# Patient Record
Sex: FEMALE | Race: WHITE | NOT HISPANIC OR LATINO | URBAN - METROPOLITAN AREA
[De-identification: names, ages, dates, MRNs, and addresses within clinical notes are randomized per-mention and may not be internally consistent; named-entity substitution may affect disease eponyms.]

---

## 2023-05-17 ENCOUNTER — OFFICE VISIT (OUTPATIENT)
Dept: PRIMARY CARE | Facility: CLINIC | Age: 33
End: 2023-05-17
Payer: COMMERCIAL

## 2023-05-17 VITALS
SYSTOLIC BLOOD PRESSURE: 128 MMHG | BODY MASS INDEX: 38.95 KG/M2 | DIASTOLIC BLOOD PRESSURE: 76 MMHG | HEIGHT: 65 IN | WEIGHT: 233.8 LBS | HEART RATE: 103 BPM

## 2023-05-17 DIAGNOSIS — F90.0 ATTENTION DEFICIT HYPERACTIVITY DISORDER (ADHD), PREDOMINANTLY INATTENTIVE TYPE: ICD-10-CM

## 2023-05-17 DIAGNOSIS — R41.840 ATTENTION DISTURBANCE: ICD-10-CM

## 2023-05-17 DIAGNOSIS — G90.A POTS (POSTURAL ORTHOSTATIC TACHYCARDIA SYNDROME): Primary | ICD-10-CM

## 2023-05-17 DIAGNOSIS — Z00.00 HEALTHCARE MAINTENANCE: ICD-10-CM

## 2023-05-17 PROBLEM — G47.00 INSOMNIA: Status: ACTIVE | Noted: 2023-05-17

## 2023-05-17 PROCEDURE — 99204 OFFICE O/P NEW MOD 45 MIN: CPT | Performed by: FAMILY MEDICINE

## 2023-05-17 PROCEDURE — 1036F TOBACCO NON-USER: CPT | Performed by: FAMILY MEDICINE

## 2023-05-17 RX ORDER — ZOLPIDEM TARTRATE 10 MG/1
10 TABLET ORAL NIGHTLY
COMMUNITY
End: 2023-05-24 | Stop reason: SDUPTHER

## 2023-05-17 RX ORDER — METOPROLOL TARTRATE 25 MG/1
25 TABLET, FILM COATED ORAL 2 TIMES DAILY
Qty: 60 TABLET | Refills: 5 | Status: SHIPPED | OUTPATIENT
Start: 2023-05-17 | End: 2023-11-13

## 2023-05-17 ASSESSMENT — ENCOUNTER SYMPTOMS
PALPITATIONS: 0
FACIAL SWELLING: 0
ACTIVITY CHANGE: 0
COUGH: 0
CONSTIPATION: 0
CONFUSION: 0
COLOR CHANGE: 0
DIARRHEA: 0
WHEEZING: 0
EYE DISCHARGE: 0
CHILLS: 0
FEVER: 0
APPETITE CHANGE: 0
ARTHRALGIAS: 0

## 2023-05-17 NOTE — PROGRESS NOTES
"Subjective   Patient ID: Abelino Dejesus is a 33 y.o. male who presents for New patient to establish .    HPI   Patient presents to [facility].  Patient is transgender and when patient was 14 was given information to talk to someone about hormone replacement therapy did not actually go through with hormone replacement therapy until age 19 or 20.  This was in [place] and [place].    Fam Hx  Mom () living,   Dad () living,     Exercise walks  ETOH once a week  Caffeine denies  Tobacco denies  From [place] moved to [place] with \"sister\" in basement in May 2020    Colonoscopy @ 43    Past Med Hx  Insomnia  POTS    Past Surg Hx  Appendectomy  Sparks teeth    Patient explains he cannot sit or stand for long periods of time.     Review of Systems   Constitutional:  Negative for activity change, appetite change, chills and fever.   HENT:  Negative for congestion, ear pain and facial swelling.    Eyes:  Negative for discharge.   Respiratory:  Negative for cough and wheezing.    Cardiovascular:  Negative for chest pain, palpitations and leg swelling.   Gastrointestinal:  Negative for constipation and diarrhea.   Musculoskeletal:  Negative for arthralgias.   Skin:  Negative for color change and pallor.   Neurological:  Negative for syncope.   Psychiatric/Behavioral:  Negative for confusion.        Objective   /76 (BP Location: Left arm)   Pulse 103   Ht 1.657 m (5' 5.25\")   Wt 106 kg (233 lb 12.8 oz)   BMI 38.61 kg/m²   BSA Body surface area is 2.21 meters squared.      Physical Exam  Constitutional:       General: He is not in acute distress.     Appearance: Normal appearance. He is not toxic-appearing.   HENT:      Head: Normocephalic.      Right Ear: Tympanic membrane, ear canal and external ear normal.      Left Ear: Tympanic membrane, ear canal and external ear normal.      Nose: Nose normal.      Mouth/Throat:      Pharynx: Oropharynx is clear.   Eyes:      Conjunctiva/sclera: Conjunctivae normal.      Pupils: " Pupils are equal, round, and reactive to light.   Cardiovascular:      Rate and Rhythm: Normal rate and regular rhythm.      Pulses: Normal pulses.      Heart sounds: Normal heart sounds.   Pulmonary:      Effort: No respiratory distress.      Breath sounds: No wheezing, rhonchi or rales.   Abdominal:      General: Bowel sounds are normal. There is no distension.      Palpations: Abdomen is soft.      Tenderness: There is no abdominal tenderness.   Musculoskeletal:         General: No swelling or tenderness.      Cervical back: No tenderness.   Skin:     Findings: No lesion or rash.   Neurological:      General: No focal deficit present.      Mental Status: He is alert and oriented to person, place, and time. Mental status is at baseline.      Gait: Gait normal.   Psychiatric:         Mood and Affect: Mood normal.         Behavior: Behavior normal.         Thought Content: Thought content normal.         Judgment: Judgment normal.       Legacy Encounter on 12/12/2022   Component Date Value Ref Range Status    SARS-COV-2 RESULT 12/12/2022 NOT DETECTED  Not Detected Final    Comment: .  This test has received FDA Emergency Use Authorization (EUA) and has been   verified by [facility]. This test is only   authorized for the duration of time that circumstances exist to justify the   authorization of the emergency use of in vitro diagnostic tests for the   detection of SARS-CoV-2 virus and/or diagnosis of COVID-19 infection under   section 564(b)(1) of the Act, 21 U.S.C. 360bbb-3(b)(1), unless the   authorization is terminated or revoked sooner.     [facility] is certified under CLIA-88 as   qualified to perform high complexity testing. Testing is performed in the   [facility] laboratory located at [address] [place] [address].    SARS-CoV-2/Flu/RSV Multiplex Test:   Fact sheet for providers:  https://www.fda.gov/media/243182/download  Fact sheet for patients: https://www.fda.gov/media/893620/download     Legacy Encounter on 08/24/2022   Component Date Value Ref Range Status    SARS-COV-2 RESULT 08/24/2022 NOT DETECTED  Not Detected Final    Comment: .  This test has received FDA Emergency Use Authorization (EUA) and has been   verified by [facility]. This test is only   authorized for the duration of time that circumstances exist to justify the   authorization of the emergency use of in vitro diagnostic tests for the   detection of SARS-CoV-2 virus and/or diagnosis of COVID-19 infection under   section 564(b)(1) of the Act, 21 U.S.C. 360bbb-3(b)(1), unless the   authorization is terminated or revoked sooner.     [facility] is certified under CLIA-88 as   qualified to perform high complexity testing. Testing is performed in the   [facility] laboratory located at [address] [place] [address].    SARS-CoV-2/Flu/RSV Multiplex Test:   Fact sheet for providers: https://www.fda.gov/media/635414/download  Fact sheet for patients: https://www.fda.gov/media/014190/download       Current Outpatient Medications on File Prior to Visit   Medication Sig Dispense Refill    zolpidem (Ambien) 10 mg tablet Take 1 tablet (10 mg) by mouth once daily at bedtime.       No current facility-administered medications on file prior to visit.     No images are attached to the encounter.            Assessment/Plan   Diagnoses and all orders for this visit:  POTS (postural orthostatic tachycardia syndrome)  -     metoprolol tartrate (Lopressor) 25 mg tablet; Take 1 tablet (25 mg) by mouth 2 times a day.  Attention disturbance  -     Referral to Psychiatry; Future  Attention deficit hyperactivity disorder (ADHD), predominantly inattentive type  -     Drug Screen, Urine With Reflex to Confirmation    1.  Patient have additional blood work  performed  2.  Patient to schedule physical exam at his earliest convenience  3.  Patient to call for questions or concerns

## 2023-05-20 ENCOUNTER — LAB (OUTPATIENT)
Dept: LAB | Facility: LAB | Age: 33
End: 2023-05-20
Payer: COMMERCIAL

## 2023-05-20 DIAGNOSIS — R41.840 ATTENTION DISTURBANCE: ICD-10-CM

## 2023-05-20 DIAGNOSIS — G90.A POTS (POSTURAL ORTHOSTATIC TACHYCARDIA SYNDROME): ICD-10-CM

## 2023-05-20 DIAGNOSIS — Z00.00 HEALTHCARE MAINTENANCE: ICD-10-CM

## 2023-05-20 LAB — THYROTROPIN (MIU/L) IN SER/PLAS BY DETECTION LIMIT <= 0.05 MIU/L: 3.29 MIU/L (ref 0.44–3.98)

## 2023-05-20 PROCEDURE — 85025 COMPLETE CBC W/AUTO DIFF WBC: CPT

## 2023-05-20 PROCEDURE — 80061 LIPID PANEL: CPT

## 2023-05-20 PROCEDURE — 80053 COMPREHEN METABOLIC PANEL: CPT

## 2023-05-20 PROCEDURE — 84443 ASSAY THYROID STIM HORMONE: CPT

## 2023-05-20 PROCEDURE — 36415 COLL VENOUS BLD VENIPUNCTURE: CPT

## 2023-05-20 PROCEDURE — 80307 DRUG TEST PRSMV CHEM ANLYZR: CPT

## 2023-05-21 LAB
AMPHETAMINE (PRESENCE) IN URINE BY SCREEN METHOD: NORMAL
BARBITURATES PRESENCE IN URINE BY SCREEN METHOD: NORMAL
BENZODIAZEPINE (PRESENCE) IN URINE BY SCREEN METHOD: NORMAL
CANNABINOIDS IN URINE BY SCREEN METHOD: NORMAL
CHOLESTEROL (MG/DL) IN SER/PLAS: 128 MG/DL (ref 0–199)
CHOLESTEROL IN HDL (MG/DL) IN SER/PLAS: 47.8 MG/DL
CHOLESTEROL/HDL RATIO: 2.7
COCAINE (PRESENCE) IN URINE BY SCREEN METHOD: NORMAL
DRUG SCREEN COMMENT URINE: NORMAL
FENTANYL URINE: NORMAL
LDL: 70 MG/DL (ref 0–99)
METHADONE (PRESENCE) IN URINE BY SCREEN METHOD: NORMAL
OPIATES (PRESENCE) IN URINE BY SCREEN METHOD: NORMAL
OXYCODONE (PRESENCE) IN URINE BY SCREEN METHOD: NORMAL
PHENCYCLIDINE (PRESENCE) IN URINE BY SCREEN METHOD: NORMAL
TOBACCO SCREEN, URINE: NEGATIVE
TRIGLYCERIDE (MG/DL) IN SER/PLAS: 50 MG/DL (ref 0–149)
VLDL: 10 MG/DL (ref 0–40)

## 2023-05-24 ENCOUNTER — OFFICE VISIT (OUTPATIENT)
Dept: PRIMARY CARE | Facility: CLINIC | Age: 33
End: 2023-05-24
Payer: COMMERCIAL

## 2023-05-24 VITALS
SYSTOLIC BLOOD PRESSURE: 122 MMHG | BODY MASS INDEX: 38.51 KG/M2 | DIASTOLIC BLOOD PRESSURE: 78 MMHG | HEART RATE: 86 BPM | WEIGHT: 233.2 LBS

## 2023-05-24 DIAGNOSIS — G47.00 INSOMNIA, UNSPECIFIED TYPE: Primary | ICD-10-CM

## 2023-05-24 PROBLEM — E66.9 OBESITY, CLASS II, BMI 35-39.9: Status: ACTIVE | Noted: 2021-04-05

## 2023-05-24 PROBLEM — E66.812 OBESITY, CLASS II, BMI 35-39.9: Status: ACTIVE | Noted: 2021-04-05

## 2023-05-24 PROCEDURE — 99214 OFFICE O/P EST MOD 30 MIN: CPT | Performed by: FAMILY MEDICINE

## 2023-05-24 PROCEDURE — 1036F TOBACCO NON-USER: CPT | Performed by: FAMILY MEDICINE

## 2023-05-24 RX ORDER — ZOLPIDEM TARTRATE 10 MG/1
10 TABLET ORAL NIGHTLY
Qty: 30 TABLET | Refills: 0 | Status: SHIPPED | OUTPATIENT
Start: 2023-05-24 | End: 2024-02-16 | Stop reason: SDUPTHER

## 2023-05-24 RX ORDER — ZOLPIDEM TARTRATE 10 MG/1
10 TABLET ORAL NIGHTLY
Qty: 30 TABLET | Refills: 0 | Status: SHIPPED | OUTPATIENT
Start: 2023-06-23 | End: 2024-02-16 | Stop reason: SDUPTHER

## 2023-05-24 RX ORDER — ZOLPIDEM TARTRATE 10 MG/1
10 TABLET ORAL NIGHTLY
Qty: 30 TABLET | Refills: 0 | Status: SHIPPED | OUTPATIENT
Start: 2023-07-23 | End: 2023-08-16 | Stop reason: SDUPTHER

## 2023-05-24 ASSESSMENT — ENCOUNTER SYMPTOMS
APPETITE CHANGE: 0
ACTIVITY CHANGE: 0
CONFUSION: 0
WHEEZING: 0
COUGH: 0
PALPITATIONS: 0
FEVER: 0
CONSTIPATION: 0
SLEEP DISTURBANCE: 1
CHILLS: 0
ARTHRALGIAS: 0
DIARRHEA: 0
EYE DISCHARGE: 0
FACIAL SWELLING: 0
COLOR CHANGE: 0

## 2023-05-24 NOTE — PROGRESS NOTES
Subjective   Patient ID: Abelino Dejesus is a 33 y.o. male who presents for Med Refill.    HPI   Patient reports he has had insomnia for quite some time.  Reports that he has tried several medications in the past without success this medication was started with his primary care doctor in [place] or [place]. Dr. Downey had started this in [place]. Tyelnol PM, Lunesta, sonata, melatonin, Zquil without relief. He was concerned that all of these were giving him restless leg syndrome.     Review of Systems   Constitutional:  Negative for activity change, appetite change, chills and fever.   HENT:  Negative for congestion, ear pain and facial swelling.    Eyes:  Negative for discharge.   Respiratory:  Negative for cough and wheezing.    Cardiovascular:  Negative for chest pain, palpitations and leg swelling.   Gastrointestinal:  Negative for constipation and diarrhea.   Musculoskeletal:  Negative for arthralgias.   Skin:  Negative for color change and pallor.   Neurological:  Negative for syncope.   Psychiatric/Behavioral:  Positive for sleep disturbance. Negative for confusion.        Objective   /78 (BP Location: Right arm)   Pulse 86   Wt 106 kg (233 lb 3.2 oz)   BMI 38.51 kg/m²   BSA Body surface area is 2.21 meters squared.      Physical Exam  Constitutional:       General: He is not in acute distress.     Appearance: Normal appearance. He is not toxic-appearing.   HENT:      Head: Normocephalic.      Right Ear: Tympanic membrane, ear canal and external ear normal.      Left Ear: Tympanic membrane, ear canal and external ear normal.   Eyes:      Conjunctiva/sclera: Conjunctivae normal.      Pupils: Pupils are equal, round, and reactive to light.   Cardiovascular:      Rate and Rhythm: Normal rate and regular rhythm.      Pulses: Normal pulses.      Heart sounds: Normal heart sounds.   Pulmonary:      Effort: No respiratory distress.      Breath sounds: No wheezing, rhonchi or rales.   Musculoskeletal:          General: No swelling or tenderness.      Cervical back: No tenderness.   Skin:     Findings: No lesion or rash.   Neurological:      General: No focal deficit present.      Mental Status: He is alert and oriented to person, place, and time. Mental status is at baseline.      Gait: Gait normal.   Psychiatric:         Mood and Affect: Mood normal.         Behavior: Behavior normal.         Thought Content: Thought content normal.         Judgment: Judgment normal.       Lab on 05/20/2023   Component Date Value Ref Range Status    Tobacco Screen, Urine 05/20/2023 NEGATIVE   Final    Cotinine, a metabolite of nicotine, is measured to screen   for nicotine exposure. The cut-off is set at 300ng/mL to   detect active exposure (smoking).    This test was developed and its performance characteristics   were determined by the [facility].    TSH 05/20/2023 3.29  0.44 - 3.98 mIU/L Final     TSH testing is performed using different testing    methodology at [facility] than at other    [facility]. Direct result comparisons should    only be made within the same method.    Cholesterol 05/20/2023 128  0 - 199 mg/dL Final    .      AGE      DESIRABLE   BORDERLINE HIGH   HIGH     0-19 Y     0 - 169       170 - 199     >/= 200    20-24 Y     0 - 189       190 - 224     >/= 225         >24 Y     0 - 199       200 - 239     >/= 240   **All ranges are based on fasting samples. Specific   therapeutic targets will vary based on patient-specific   cardiac risk.  .   Pediatric guidelines reference:Pediatrics 2011, 128(S5).   Adult guidelines reference: NCEP ATPIII Guidelines,     YASMIN 2001, 258:2486-97  .   Venipuncture immediately after or during the    administration of Metamizole may lead to falsely   low results. Testing should be performed immediately   prior to Metamizole dosing.    HDL 05/20/2023 47.8  mg/dL Final    .      AGE      VERY LOW   LOW     NORMAL    HIGH        0-19 Y       < 35   < 40     40-45     ----    20-24 Y       ----   < 40       >45     ----      >24 Y       ----   < 40     40-60      >60  .    Cholesterol/HDL Ratio 05/20/2023 2.7   Final    REF VALUES  DESIRABLE  < 3.4  HIGH RISK  > 5.0    LDL 05/20/2023 70  0 - 99 mg/dL Final    .                           NEAR      BORD      AGE      DESIRABLE  OPTIMAL    HIGH     HIGH     VERY HIGH     0-19 Y     0 - 109     ---    110-129   >/= 130     ----    20-24 Y     0 - 119     ---    120-159   >/= 160     ----      >24 Y     0 -  99   100-129  130-159   160-189     >/=190  .    VLDL 05/20/2023 10  0 - 40 mg/dL Final    Triglycerides 05/20/2023 50  0 - 149 mg/dL Final    .      AGE      DESIRABLE   BORDERLINE HIGH   HIGH     VERY HIGH   0 D-90 D    19 - 174         ----         ----        ----  91 D- 9 Y     0 -  74        75 -  99     >/= 100      ----    10-19 Y     0 -  89        90 - 129     >/= 130      ----    20-24 Y     0 - 114       115 - 149     >/= 150      ----         >24 Y     0 - 149       150 - 199    200- 499    >/= 500  .   Venipuncture immediately after or during the    administration of Metamizole may lead to falsely   low results. Testing should be performed immediately   prior to Metamizole dosing.    Glucose 05/20/2023 96  74 - 99 mg/dL Final    Sodium 05/20/2023 138  136 - 145 mmol/L Final    Potassium 05/20/2023 4.3  3.5 - 5.3 mmol/L Final    Chloride 05/20/2023 108 (H)  98 - 107 mmol/L Final    Bicarbonate 05/20/2023 24  21 - 32 mmol/L Final    Anion Gap 05/20/2023 10  10 - 20 mmol/L Final    Urea Nitrogen 05/20/2023 13  6 - 23 mg/dL Final    Creatinine 05/20/2023 0.88  0.50 - 1.30 mg/dL Final    GFR MALE 05/20/2023 >90  >90 mL/min/1.73m2 Final     CALCULATIONS OF ESTIMATED GFR ARE PERFORMED   USING THE 2021 CKD-EPI STUDY REFIT EQUATION   WITHOUT THE RACE VARIABLE FOR THE IDMS-TRACEABLE   CREATININE METHODS.    https://jasn.asnjournals.org/content/early/2021/09/22/ASN.6325128894     Calcium 05/20/2023 9.0  8.6 - 10.6 mg/dL Final    Albumin 05/20/2023 4.3  3.4 - 5.0 g/dL Final    Alkaline Phosphatase 05/20/2023 67  33 - 120 U/L Final    Total Protein 05/20/2023 7.1  6.4 - 8.2 g/dL Final    AST 05/20/2023 17  9 - 39 U/L Final    Total Bilirubin 05/20/2023 0.3  0.0 - 1.2 mg/dL Final    ALT (SGPT) 05/20/2023 21  10 - 52 U/L Final     Patients treated with Sulfasalazine may generate    falsely decreased results for ALT.    WBC 05/20/2023 7.0  4.4 - 11.3 x10E9/L Final    nRBC 05/20/2023 0.0  0.0 - 0.0 /100 WBC Final    RBC 05/20/2023 4.52  4.50 - 5.90 x10E12/L Final    Hemoglobin 05/20/2023 14.3  13.5 - 17.5 g/dL Final    Hematocrit 05/20/2023 42.9  41.0 - 52.0 % Final    MCV 05/20/2023 95  80 - 100 fL Final    MCHC 05/20/2023 33.3  32.0 - 36.0 g/dL Final    Platelets 05/20/2023 266  150 - 450 x10E9/L Final    RDW 05/20/2023 12.2  11.5 - 14.5 % Final    Neutrophils % 05/20/2023 51.5  40.0 - 80.0 % Final    Immature Granulocytes %, Automated 05/20/2023 0.1  0.0 - 0.9 % Final     Immature Granulocyte Count (IG) includes promyelocytes,    myelocytes and metamyelocytes but does not include bands.   Percent differential counts (%) should be interpreted in the   context of the absolute cell counts (cells/L).    Lymphocytes % 05/20/2023 37.7  13.0 - 44.0 % Final    Monocytes % 05/20/2023 8.1  2.0 - 10.0 % Final    Eosinophils % 05/20/2023 1.7  0.0 - 6.0 % Final    Basophils % 05/20/2023 0.9  0.0 - 2.0 % Final    Neutrophils Absolute 05/20/2023 3.62  1.20 - 7.70 x10E9/L Final    Lymphocytes Absolute 05/20/2023 2.65  1.20 - 4.80 x10E9/L Final    Monocytes Absolute 05/20/2023 0.57  0.10 - 1.00 x10E9/L Final    Eosinophils Absolute 05/20/2023 0.12  0.00 - 0.70 x10E9/L Final    Basophils Absolute 05/20/2023 0.06  0.00 - 0.10 x10E9/L Final   Office Visit on 05/17/2023   Component Date Value Ref Range Status    DRUG SCREEN COMMENT URINE 05/20/2023 SEE BELOW   Final    Drug screen results are presumptive and should  not be used to assess   compliance with prescribed medication. Definitive confirmatory drug testing   has been added to this sample for any positive screen result and will be   reported separately.   .  Toxicology screening results are reported qualitatively. The concentration   must be greater than or equal to the cutoff to be reported as positive. The   concentration at which the screening test can detect an individual drug or   metabolite varies. The absence of expected drug(s) and/or drug metabolite(s)   may indicate non-compliance, inappropriate timing of specimen collection   relative to drug administration, poor drug absorption, diluted/adulterated   urine, or limitations of testing. For medical purposes only; not valid for   forensic use.   .  Interpretive questions should be directed to the laboratory medical   directors.      Amphetamine Screen, Urine 05/20/2023 PRESUMPTIVE NEGATIVE  NEGATIVE Final     CUTOFF LEVEL:  500 NG/ML   Cross-reactivity has been reported with high concentrations   of the following drugs: buproprion, chloroquine, chlorpromazine,   ephedrine, mephentermine, fenfluramine, phentermine,   phenylpropanolamine, pseudoephedrine, and propranolol.    Barbiturate Screen, Urine 05/20/2023 PRESUMPTIVE NEGATIVE  NEGATIVE Final     CUTOFF LEVEL: 200 NG/ML    BENZODIAZEPINE (PRESENCE) IN URINE* 05/20/2023 PRESUMPTIVE NEGATIVE  NEGATIVE Final     CUTOFF LEVEL: 200 NG/ML    Cannabinoid Screen, Urine 05/20/2023 PRESUMPTIVE NEGATIVE  NEGATIVE Final     CUTOFF LEVEL: 50 NG/ML    Cocaine Screen, Urine 05/20/2023 PRESUMPTIVE NEGATIVE  NEGATIVE Final     CUTOFF LEVEL: 150 NG/ML    Fentanyl, Ur 05/20/2023 PRESUMPTIVE NEGATIVE  NEGATIVE Final     CUTOFF LEVEL:  5 NG/ML    Methadone Screen, Urine 05/20/2023 PRESUMPTIVE NEGATIVE  NEGATIVE Final     CUTOFF LEVEL: 150 NG/ML   The metabolite L-alpha-acetylmethadol (LAAM) is not   detected by this method in concentrations that would   be found in the urine of  patients on LAAM therapy.    Opiate Screen, Urine 05/20/2023 PRESUMPTIVE NEGATIVE  NEGATIVE Final     CUTOFF LEVEL: 300 NG/ML   The opiate screen does not detect fentanyl, meperidine, or    tramadol. Oxycodone is not consistently detected (refer to   Oxycodone Screen, Urine result).    Oxycodone Screen, Ur 05/20/2023 PRESUMPTIVE NEGATIVE  NEGATIVE Final     CUTOFF LEVEL: 100 NG/ML    This test will accurately detect both oxycodone and oxymorphone.         PCP Screen, Urine 05/20/2023 PRESUMPTIVE NEGATIVE  NEGATIVE Final     CUTOFF LEVEL:  25 NG/ML   Cross-reactivity has been reported with dextromethorphan.   Legacy Encounter on 12/12/2022   Component Date Value Ref Range Status    SARS-COV-2 RESULT 12/12/2022 NOT DETECTED  Not Detected Final    Comment: .  This test has received FDA Emergency Use Authorization (EUA) and has been   verified by [facility]. This test is only   authorized for the duration of time that circumstances exist to justify the   authorization of the emergency use of in vitro diagnostic tests for the   detection of SARS-CoV-2 virus and/or diagnosis of COVID-19 infection under   section 564(b)(1) of the Act, 21 U.S.C. 360bbb-3(b)(1), unless the   authorization is terminated or revoked sooner.     [facility] is certified under CLIA-88 as   qualified to perform high complexity testing. Testing is performed in the   [facility] laboratory located at [address] [place] [address].    SARS-CoV-2/Flu/RSV Multiplex Test:   Fact sheet for providers: https://www.fda.gov/media/232462/download  Fact sheet for patients: https://www.fda.gov/media/807018/download     Legacy Encounter on 08/24/2022   Component Date Value Ref Range Status    SARS-COV-2 RESULT 08/24/2022 NOT DETECTED  Not Detected Final    Comment: .  This test has received FDA Emergency Use Authorization (EUA) and has been   verified by [facility]  [facility]. This test is only   authorized for the duration of time that circumstances exist to justify the   authorization of the emergency use of in vitro diagnostic tests for the   detection of SARS-CoV-2 virus and/or diagnosis of COVID-19 infection under   section 564(b)(1) of the Act, 21 U.S.C. 360bbb-3(b)(1), unless the   authorization is terminated or revoked sooner.     [facility] is certified under CLIA-88 as   qualified to perform high complexity testing. Testing is performed in the   [facility] laboratory located at [address] [place] [address].    SARS-CoV-2/Flu/RSV Multiplex Test:   Fact sheet for providers: https://www.fda.gov/media/584244/download  Fact sheet for patients: https://www.fda.gov/media/345553/download       Current Outpatient Medications on File Prior to Visit   Medication Sig Dispense Refill    metoprolol tartrate (Lopressor) 25 mg tablet Take 1 tablet (25 mg) by mouth 2 times a day. 60 tablet 5    zolpidem (Ambien) 10 mg tablet Take 1 tablet (10 mg) by mouth once daily at bedtime.       No current facility-administered medications on file prior to visit.     No images are attached to the encounter.      OARRS:  Lilly Grimaldo DO on 5/24/2023  7:47 AM  I have personally reviewed the OARRS report for Álvaro Dejesus. I have considered the risks of abuse, dependence, addiction and diversion    Is the patient prescribed a combination of a benzodiazepine and opioid?  No    Last Urine Drug Screen / ordered today: No  Recent Results (from the past 07151 hour(s))   Drug Screen, Urine With Reflex to Confirmation    Collection Time: 05/20/23  9:28 AM   Result Value Ref Range    DRUG SCREEN COMMENT URINE SEE BELOW     Amphetamine Screen, Urine PRESUMPTIVE NEGATIVE NEGATIVE    Barbiturate Screen, Urine PRESUMPTIVE NEGATIVE NEGATIVE    BENZODIAZEPINE (PRESENCE) IN URINE BY SCREEN METHOD PRESUMPTIVE NEGATIVE NEGATIVE    Cannabinoid  Screen, Urine PRESUMPTIVE NEGATIVE NEGATIVE    Cocaine Screen, Urine PRESUMPTIVE NEGATIVE NEGATIVE    Fentanyl, Ur PRESUMPTIVE NEGATIVE NEGATIVE    Methadone Screen, Urine PRESUMPTIVE NEGATIVE NEGATIVE    Opiate Screen, Urine PRESUMPTIVE NEGATIVE NEGATIVE    Oxycodone Screen, Ur PRESUMPTIVE NEGATIVE NEGATIVE    PCP Screen, Urine PRESUMPTIVE NEGATIVE NEGATIVE     Results are as expected.     Controlled Substance Agreement:  Date of the Last Agreement: 5/24/2023  Reviewed Controlled Substance Agreement including but not limited to the benefits, risks, and alternatives to treatment with a Controlled Substance medication(s).    Sleep Aids:   What is the patient's goal of therapy? Better sleep  Is this being achieved with current treatment? yes    Activities of Daily Living:   Is your overall impression that this patient is benefiting (symptom reduction outweighs side effects) from sleep aid therapy? Yes     1. Physical Functioning: Same  2. Family Relationship: Same  3. Social Relationship: Same  4. Mood: Same  5. Sleep Patterns: Same  6. Overall Function: Same        Assessment/Plan   Diagnoses and all orders for this visit:  Insomnia, unspecified type  1.  Patient realizes he has to come in every 90 days to have this medication renewed  2.  Patient did sign Ambien contract 5/24/2023  3.  Patient's urine drug screen performed 5/20/2023  4.  Patient to call for questions or concerns

## 2023-05-24 NOTE — PATIENT INSTRUCTIONS
1.  Patient realizes he has to come in every 90 days to have this medication renewed  2.  Patient did sign Ambien contract 5/24/2023  3.  Patient's urine drug screen performed 5/20/2023  4.  Patient to call for questions or concerns

## 2023-06-14 LAB
ALANINE AMINOTRANSFERASE (SGPT) (U/L) IN SER/PLAS: 21 U/L (ref 7–45)
ALBUMIN (G/DL) IN SER/PLAS: 4.3 G/DL (ref 3.4–5)
ALKALINE PHOSPHATASE (U/L) IN SER/PLAS: 67 U/L (ref 33–110)
ANION GAP IN SER/PLAS: 10 MMOL/L (ref 10–20)
ASPARTATE AMINOTRANSFERASE (SGOT) (U/L) IN SER/PLAS: 17 U/L (ref 9–39)
BASOPHILS (10*3/UL) IN BLOOD BY AUTOMATED COUNT: 0.06 X10E9/L (ref 0–0.1)
BASOPHILS/100 LEUKOCYTES IN BLOOD BY AUTOMATED COUNT: 0.9 % (ref 0–2)
BILIRUBIN TOTAL (MG/DL) IN SER/PLAS: 0.3 MG/DL (ref 0–1.2)
CALCIUM (MG/DL) IN SER/PLAS: 9 MG/DL (ref 8.6–10.6)
CARBON DIOXIDE, TOTAL (MMOL/L) IN SER/PLAS: 24 MMOL/L (ref 21–32)
CHLORIDE (MMOL/L) IN SER/PLAS: 108 MMOL/L (ref 98–107)
CREATININE (MG/DL) IN SER/PLAS: 0.88 MG/DL (ref 0.5–1.05)
EOSINOPHILS (10*3/UL) IN BLOOD BY AUTOMATED COUNT: 0.12 X10E9/L (ref 0–0.7)
EOSINOPHILS/100 LEUKOCYTES IN BLOOD BY AUTOMATED COUNT: 1.7 % (ref 0–6)
ERYTHROCYTE DISTRIBUTION WIDTH (RATIO) BY AUTOMATED COUNT: 12.2 % (ref 11.5–14.5)
ERYTHROCYTE MEAN CORPUSCULAR HEMOGLOBIN CONCENTRATION (G/DL) BY AUTOMATED: 33.3 G/DL (ref 32–36)
ERYTHROCYTE MEAN CORPUSCULAR VOLUME (FL) BY AUTOMATED COUNT: 95 FL (ref 80–100)
ERYTHROCYTES (10*6/UL) IN BLOOD BY AUTOMATED COUNT: 4.52 X10E12/L (ref 4–5.2)
GFR FEMALE: 88 ML/MIN/1.73M2
GLUCOSE (MG/DL) IN SER/PLAS: 96 MG/DL (ref 74–99)
HEMATOCRIT (%) IN BLOOD BY AUTOMATED COUNT: 42.9 % (ref 36–46)
HEMOGLOBIN (G/DL) IN BLOOD: 14.3 G/DL (ref 12–16)
IMMATURE GRANULOCYTES/100 LEUKOCYTES IN BLOOD BY AUTOMATED COUNT: 0.1 % (ref 0–0.9)
LEUKOCYTES (10*3/UL) IN BLOOD BY AUTOMATED COUNT: 7 X10E9/L (ref 4.4–11.3)
LYMPHOCYTES (10*3/UL) IN BLOOD BY AUTOMATED COUNT: 2.65 X10E9/L (ref 1.2–4.8)
LYMPHOCYTES/100 LEUKOCYTES IN BLOOD BY AUTOMATED COUNT: 37.7 % (ref 13–44)
MONOCYTES (10*3/UL) IN BLOOD BY AUTOMATED COUNT: 0.57 X10E9/L (ref 0.1–1)
MONOCYTES/100 LEUKOCYTES IN BLOOD BY AUTOMATED COUNT: 8.1 % (ref 2–10)
NEUTROPHILS (10*3/UL) IN BLOOD BY AUTOMATED COUNT: 3.62 X10E9/L (ref 1.2–7.7)
NEUTROPHILS/100 LEUKOCYTES IN BLOOD BY AUTOMATED COUNT: 51.5 % (ref 40–80)
NRBC (PER 100 WBCS) BY AUTOMATED COUNT: 0 /100 WBC (ref 0–0)
PLATELETS (10*3/UL) IN BLOOD AUTOMATED COUNT: 266 X10E9/L (ref 150–450)
POTASSIUM (MMOL/L) IN SER/PLAS: 4.3 MMOL/L (ref 3.5–5.3)
PROTEIN TOTAL: 7.1 G/DL (ref 6.4–8.2)
SODIUM (MMOL/L) IN SER/PLAS: 138 MMOL/L (ref 136–145)
UREA NITROGEN (MG/DL) IN SER/PLAS: 13 MG/DL (ref 6–23)

## 2023-08-16 ENCOUNTER — OFFICE VISIT (OUTPATIENT)
Dept: PRIMARY CARE | Facility: CLINIC | Age: 33
End: 2023-08-16
Payer: COMMERCIAL

## 2023-08-16 VITALS
HEART RATE: 93 BPM | BODY MASS INDEX: 38.51 KG/M2 | WEIGHT: 233.2 LBS | SYSTOLIC BLOOD PRESSURE: 118 MMHG | DIASTOLIC BLOOD PRESSURE: 78 MMHG

## 2023-08-16 DIAGNOSIS — G47.00 INSOMNIA, UNSPECIFIED TYPE: Primary | ICD-10-CM

## 2023-08-16 PROCEDURE — 1036F TOBACCO NON-USER: CPT | Performed by: FAMILY MEDICINE

## 2023-08-16 PROCEDURE — 99214 OFFICE O/P EST MOD 30 MIN: CPT | Performed by: FAMILY MEDICINE

## 2023-08-16 RX ORDER — ZOLPIDEM TARTRATE 10 MG/1
10 TABLET ORAL NIGHTLY
Qty: 30 TABLET | Refills: 0 | Status: SHIPPED | OUTPATIENT
Start: 2023-09-15 | End: 2024-02-16 | Stop reason: SDUPTHER

## 2023-08-16 RX ORDER — ZOLPIDEM TARTRATE 10 MG/1
10 TABLET ORAL NIGHTLY
Qty: 30 TABLET | Refills: 0 | Status: SHIPPED | OUTPATIENT
Start: 2023-10-15 | End: 2023-11-17 | Stop reason: SDUPTHER

## 2023-08-16 RX ORDER — ZOLPIDEM TARTRATE 10 MG/1
10 TABLET ORAL NIGHTLY
Qty: 30 TABLET | Refills: 0 | Status: SHIPPED | OUTPATIENT
Start: 2023-08-16 | End: 2024-02-16 | Stop reason: SDUPTHER

## 2023-08-16 ASSESSMENT — ENCOUNTER SYMPTOMS
CONSTIPATION: 0
CHILLS: 0
FEVER: 0
ACTIVITY CHANGE: 0
CONFUSION: 0
ARTHRALGIAS: 0
WHEEZING: 0
DIARRHEA: 0
COLOR CHANGE: 0
APPETITE CHANGE: 0
EYE DISCHARGE: 0
PALPITATIONS: 0
FACIAL SWELLING: 0
COUGH: 0
SLEEP DISTURBANCE: 1

## 2023-08-16 NOTE — PROGRESS NOTES
Subjective   Patient ID: Abelino Dejesus is a 33 y.o. adult who presents for No chief complaint on file..    HPI   Patient reports he has had insomnia for quite some time.  Reports that he has tried several medications in the past without success this medication was started with his primary care doctor in [place] or [place]. Dr. Downey had started this in [place]. Tyelnol PM, Lunesta, sonata, melatonin, Zquil without relief. He was concerned that all of these were giving him restless leg syndrome.     Review of Systems   Constitutional:  Negative for activity change, appetite change, chills and fever.   HENT:  Negative for congestion, ear pain and facial swelling.    Eyes:  Negative for discharge.   Respiratory:  Negative for cough and wheezing.    Cardiovascular:  Negative for chest pain, palpitations and leg swelling.   Gastrointestinal:  Negative for constipation and diarrhea.   Musculoskeletal:  Negative for arthralgias.   Skin:  Negative for color change and pallor.   Neurological:  Negative for syncope.   Psychiatric/Behavioral:  Positive for sleep disturbance. Negative for confusion.        Objective   There were no vitals taken for this visit.  BSA There is no height or weight on file to calculate BSA.      Physical Exam  Constitutional:       General: He is not in acute distress.     Appearance: Normal appearance. He is not toxic-appearing.   HENT:      Head: Normocephalic.      Right Ear: Tympanic membrane, ear canal and external ear normal.      Left Ear: Tympanic membrane, ear canal and external ear normal.   Eyes:      Conjunctiva/sclera: Conjunctivae normal.      Pupils: Pupils are equal, round, and reactive to light.   Cardiovascular:      Rate and Rhythm: Normal rate and regular rhythm.      Pulses: Normal pulses.      Heart sounds: Normal heart sounds.   Pulmonary:      Effort: No respiratory distress.      Breath sounds: No wheezing, rhonchi or rales.   Musculoskeletal:         General: No swelling or  tenderness.      Cervical back: No tenderness.   Skin:     Findings: No lesion or rash.   Neurological:      General: No focal deficit present.      Mental Status: He is alert and oriented to person, place, and time. Mental status is at baseline.      Gait: Gait normal.   Psychiatric:         Mood and Affect: Mood normal.         Behavior: Behavior normal.         Thought Content: Thought content normal.         Judgment: Judgment normal.       Lab on 05/20/2023   Component Date Value Ref Range Status    Tobacco Screen, Urine 05/20/2023 NEGATIVE   Final    Cotinine, a metabolite of nicotine, is measured to screen   for nicotine exposure. The cut-off is set at 300ng/mL to   detect active exposure (smoking).    This test was developed and its performance characteristics   were determined by the [facility].    TSH 05/20/2023 3.29  0.44 - 3.98 mIU/L Final     TSH testing is performed using different testing    methodology at [facility] than at other    [facility]. Direct result comparisons should    only be made within the same method.    Cholesterol 05/20/2023 128  0 - 199 mg/dL Final    .      AGE      DESIRABLE   BORDERLINE HIGH   HIGH     0-19 Y     0 - 169       170 - 199     >/= 200    20-24 Y     0 - 189       190 - 224     >/= 225         >24 Y     0 - 199       200 - 239     >/= 240   **All ranges are based on fasting samples. Specific   therapeutic targets will vary based on patient-specific   cardiac risk.  .   Pediatric guidelines reference:Pediatrics 2011, 128(S5).   Adult guidelines reference: NCEP ATPIII Guidelines,     YASMIN 2001, 258:2486-97  .   Venipuncture immediately after or during the    administration of Metamizole may lead to falsely   low results. Testing should be performed immediately   prior to Metamizole dosing.    HDL 05/20/2023 47.8  mg/dL Final    .      AGE      VERY LOW   LOW     NORMAL    HIGH       0-19 Y       < 35    < 40     40-45     ----    20-24 Y       ----   < 40       >45     ----      >24 Y       ----   < 40     40-60      >60  .    Cholesterol/HDL Ratio 05/20/2023 2.7   Final    REF VALUES  DESIRABLE  < 3.4  HIGH RISK  > 5.0    LDL 05/20/2023 70  0 - 99 mg/dL Final    .                           NEAR      BORD      AGE      DESIRABLE  OPTIMAL    HIGH     HIGH     VERY HIGH     0-19 Y     0 - 109     ---    110-129   >/= 130     ----    20-24 Y     0 - 119     ---    120-159   >/= 160     ----      >24 Y     0 -  99   100-129  130-159   160-189     >/=190  .    VLDL 05/20/2023 10  0 - 40 mg/dL Final    Triglycerides 05/20/2023 50  0 - 149 mg/dL Final    .      AGE      DESIRABLE   BORDERLINE HIGH   HIGH     VERY HIGH   0 D-90 D    19 - 174         ----         ----        ----  91 D- 9 Y     0 -  74        75 -  99     >/= 100      ----    10-19 Y     0 -  89        90 - 129     >/= 130      ----    20-24 Y     0 - 114       115 - 149     >/= 150      ----         >24 Y     0 - 149       150 - 199    200- 499    >/= 500  .   Venipuncture immediately after or during the    administration of Metamizole may lead to falsely   low results. Testing should be performed immediately   prior to Metamizole dosing.    Glucose 05/20/2023 96  74 - 99 mg/dL Final    Sodium 05/20/2023 138  136 - 145 mmol/L Final    Potassium 05/20/2023 4.3  3.5 - 5.3 mmol/L Final    Chloride 05/20/2023 108 (H)  98 - 107 mmol/L Final    Bicarbonate 05/20/2023 24  21 - 32 mmol/L Final    Anion Gap 05/20/2023 10  10 - 20 mmol/L Final    Urea Nitrogen 05/20/2023 13  6 - 23 mg/dL Final    Creatinine 05/20/2023 0.88  0.50 - 1.05 mg/dL Final    GFR Female 05/20/2023 88  >90 mL/min/1.73m2 Corrected     CALCULATIONS OF ESTIMATED GFR ARE PERFORMED   USING THE 2021 CKD-EPI STUDY REFIT EQUATION   WITHOUT THE RACE VARIABLE FOR THE IDMS-TRACEABLE   CREATININE METHODS.    https://jasn.asnjournals.org/content/early/2021/09/22/ASN.[email or web address]  This is a corrected  result. Previous value was DNR, verified at 06/14/2023   10:00    Calcium 05/20/2023 9.0  8.6 - 10.6 mg/dL Final    Albumin 05/20/2023 4.3  3.4 - 5.0 g/dL Final    Alkaline Phosphatase 05/20/2023 67  33 - 110 U/L Final    Total Protein 05/20/2023 7.1  6.4 - 8.2 g/dL Final    AST 05/20/2023 17  9 - 39 U/L Final    Total Bilirubin 05/20/2023 0.3  0.0 - 1.2 mg/dL Final    ALT (SGPT) 05/20/2023 21  7 - 45 U/L Final     Patients treated with Sulfasalazine may generate    falsely decreased results for ALT.    WBC 05/20/2023 7.0  4.4 - 11.3 x10E9/L Final    nRBC 05/20/2023 0.0  0.0 - 0.0 /100 WBC Final    RBC 05/20/2023 4.52  4.00 - 5.20 x10E12/L Final    Hemoglobin 05/20/2023 14.3  12.0 - 16.0 g/dL Final    Hematocrit 05/20/2023 42.9  36.0 - 46.0 % Final    MCV 05/20/2023 95  80 - 100 fL Final    MCHC 05/20/2023 33.3  32.0 - 36.0 g/dL Final    Platelets 05/20/2023 266  150 - 450 x10E9/L Final    RDW 05/20/2023 12.2  11.5 - 14.5 % Final    Neutrophils % 05/20/2023 51.5  40.0 - 80.0 % Final    Immature Granulocytes %, Automated 05/20/2023 0.1  0.0 - 0.9 % Final     Immature Granulocyte Count (IG) includes promyelocytes,    myelocytes and metamyelocytes but does not include bands.   Percent differential counts (%) should be interpreted in the   context of the absolute cell counts (cells/L).    Lymphocytes % 05/20/2023 37.7  13.0 - 44.0 % Final    Monocytes % 05/20/2023 8.1  2.0 - 10.0 % Final    Eosinophils % 05/20/2023 1.7  0.0 - 6.0 % Final    Basophils % 05/20/2023 0.9  0.0 - 2.0 % Final    Neutrophils Absolute 05/20/2023 3.62  1.20 - 7.70 x10E9/L Final    Lymphocytes Absolute 05/20/2023 2.65  1.20 - 4.80 x10E9/L Final    Monocytes Absolute 05/20/2023 0.57  0.10 - 1.00 x10E9/L Final    Eosinophils Absolute 05/20/2023 0.12  0.00 - 0.70 x10E9/L Final    Basophils Absolute 05/20/2023 0.06  0.00 - 0.10 x10E9/L Final   Office Visit on 05/17/2023   Component Date Value Ref Range Status    DRUG SCREEN COMMENT URINE 05/20/2023  SEE BELOW   Final    Drug screen results are presumptive and should not be used to assess   compliance with prescribed medication. Definitive confirmatory drug testing   has been added to this sample for any positive screen result and will be   reported separately.   .  Toxicology screening results are reported qualitatively. The concentration   must be greater than or equal to the cutoff to be reported as positive. The   concentration at which the screening test can detect an individual drug or   metabolite varies. The absence of expected drug(s) and/or drug metabolite(s)   may indicate non-compliance, inappropriate timing of specimen collection   relative to drug administration, poor drug absorption, diluted/adulterated   urine, or limitations of testing. For medical purposes only; not valid for   forensic use.   .  Interpretive questions should be directed to the laboratory medical   directors.      Amphetamine Screen, Urine 05/20/2023 PRESUMPTIVE NEGATIVE  NEGATIVE Final     CUTOFF LEVEL:  500 NG/ML   Cross-reactivity has been reported with high concentrations   of the following drugs: buproprion, chloroquine, chlorpromazine,   ephedrine, mephentermine, fenfluramine, phentermine,   phenylpropanolamine, pseudoephedrine, and propranolol.    Barbiturate Screen, Urine 05/20/2023 PRESUMPTIVE NEGATIVE  NEGATIVE Final     CUTOFF LEVEL: 200 NG/ML    BENZODIAZEPINE (PRESENCE) IN URINE* 05/20/2023 PRESUMPTIVE NEGATIVE  NEGATIVE Final     CUTOFF LEVEL: 200 NG/ML    Cannabinoid Screen, Urine 05/20/2023 PRESUMPTIVE NEGATIVE  NEGATIVE Final     CUTOFF LEVEL: 50 NG/ML    Cocaine Screen, Urine 05/20/2023 PRESUMPTIVE NEGATIVE  NEGATIVE Final     CUTOFF LEVEL: 150 NG/ML    Fentanyl, Ur 05/20/2023 PRESUMPTIVE NEGATIVE  NEGATIVE Final     CUTOFF LEVEL:  5 NG/ML    Methadone Screen, Urine 05/20/2023 PRESUMPTIVE NEGATIVE  NEGATIVE Final     CUTOFF LEVEL: 150 NG/ML   The metabolite L-alpha-acetylmethadol (LAAM) is not   detected by  this method in concentrations that would   be found in the urine of patients on LAAM therapy.    Opiate Screen, Urine 05/20/2023 PRESUMPTIVE NEGATIVE  NEGATIVE Final     CUTOFF LEVEL: 300 NG/ML   The opiate screen does not detect fentanyl, meperidine, or    tramadol. Oxycodone is not consistently detected (refer to   Oxycodone Screen, Urine result).    Oxycodone Screen, Ur 05/20/2023 PRESUMPTIVE NEGATIVE  NEGATIVE Final     CUTOFF LEVEL: 100 NG/ML    This test will accurately detect both oxycodone and oxymorphone.         PCP Screen, Urine 05/20/2023 PRESUMPTIVE NEGATIVE  NEGATIVE Final     CUTOFF LEVEL:  25 NG/ML   Cross-reactivity has been reported with dextromethorphan.   [facility] Encounter on 12/12/2022   Component Date Value Ref Range Status    SARS-CoV-2 Result 12/12/2022 NOT DETECTED  Not Detected Final    Comment: .  This test has received FDA Emergency Use Authorization (EUA) and has been   verified by [facility]. This test is only   authorized for the duration of time that circumstances exist to justify the   authorization of the emergency use of in vitro diagnostic tests for the   detection of SARS-CoV-2 virus and/or diagnosis of COVID-19 infection under   section 564(b)(1) of the Act, 21 U.S.C. 360bbb-3(b)(1), unless the   authorization is terminated or revoked sooner.     [facility] is certified under CLIA-88 as   qualified to perform high complexity testing. Testing is performed in the   [facility] laboratory located at [address] [place] [address].    SARS-CoV-2/Flu/RSV Multiplex Test:   Fact sheet for providers: https://www.fda.gov/media/403610/download  Fact sheet for patients: https://www.fda.gov/media/449284/download     [facility] Encounter on 08/24/2022   Component Date Value Ref Range Status    SARS-CoV-2 Result 08/24/2022 NOT DETECTED  Not Detected Final    Comment: .  This test has received FDA Emergency Use  Authorization (EUA) and has been   verified by . This test is only   authorized for the duration of time that circumstances exist to justify the   authorization of the emergency use of in vitro diagnostic tests for the   detection of SARS-CoV-2 virus and/or diagnosis of COVID-19 infection under   section 564(b)(1) of the Act, 21 U.S.C. 360bbb-3(b)(1), unless the   authorization is terminated or revoked sooner.      is certified under CLIA-88 as   qualified to perform high complexity testing. Testing is performed in the   [facility] laboratory located at [address] [place] [address].    SARS-CoV-2/Flu/RSV Multiplex Test:   Fact sheet for providers: https://www.fda.gov/media/398079/download  Fact sheet for patients: https://www.fda.gov/media/555057/download       Current Outpatient Medications on File Prior to Visit   Medication Sig Dispense Refill    metoprolol tartrate (Lopressor) 25 mg tablet Take 1 tablet (25 mg) by mouth 2 times a day. 60 tablet 5    zolpidem (Ambien) 10 mg tablet Take 1 tablet (10 mg) by mouth once daily at bedtime. Do not start before July 23, 2023. 30 tablet 0    zolpidem (Ambien) 10 mg tablet Take 1 tablet (10 mg) by mouth once daily at bedtime. 30 tablet 0    zolpidem (Ambien) 10 mg tablet Take 1 tablet (10 mg) by mouth once daily at bedtime. Do not start before June 23, 2023. 30 tablet 0     No current facility-administered medications on file prior to visit.     No images are attached to the encounter.      OARRS:  Lilly Grimaldo DO on 8/16/2023  7:41 AM  I have personally reviewed the OARRS report for Álvaro Dejesus. I have considered the risks of abuse, dependence, addiction and diversion    Is the patient prescribed a combination of a benzodiazepine and opioid?  No    Last Urine Drug Screen / ordered today: No  Recent Results (from the past 65229 hour(s))   Drug Screen, Urine With  Reflex to Confirmation    Collection Time: 05/20/23  9:28 AM   Result Value Ref Range    DRUG SCREEN COMMENT URINE SEE BELOW     Amphetamine Screen, Urine PRESUMPTIVE NEGATIVE NEGATIVE    Barbiturate Screen, Urine PRESUMPTIVE NEGATIVE NEGATIVE    BENZODIAZEPINE (PRESENCE) IN URINE BY SCREEN METHOD PRESUMPTIVE NEGATIVE NEGATIVE    Cannabinoid Screen, Urine PRESUMPTIVE NEGATIVE NEGATIVE    Cocaine Screen, Urine PRESUMPTIVE NEGATIVE NEGATIVE    Fentanyl, Ur PRESUMPTIVE NEGATIVE NEGATIVE    Methadone Screen, Urine PRESUMPTIVE NEGATIVE NEGATIVE    Opiate Screen, Urine PRESUMPTIVE NEGATIVE NEGATIVE    Oxycodone Screen, Ur PRESUMPTIVE NEGATIVE NEGATIVE    PCP Screen, Urine PRESUMPTIVE NEGATIVE NEGATIVE     Results are as expected.     Controlled Substance Agreement:  Date of the Last Agreement: 5/24/2023  Reviewed Controlled Substance Agreement including but not limited to the benefits, risks, and alternatives to treatment with a Controlled Substance medication(s).    Sleep Aids:   What is the patient's goal of therapy? Better sleep  Is this being achieved with current treatment? yes    Activities of Daily Living:   Is your overall impression that this patient is benefiting (symptom reduction outweighs side effects) from sleep aid therapy? Yes     1. Physical Functioning: Same  2. Family Relationship: Same  3. Social Relationship: Same  4. Mood: Same  5. Sleep Patterns: Same  6. Overall Function: Same        Assessment/Plan   Diagnoses and all orders for this visit:  Insomnia, unspecified type  1.  Patient realizes he has to come in every 90 days to have this medication renewed  2.  Patient did sign Ambien contract 5/24/2023  3.  Patient's urine drug screen performed 5/20/2023  4.  Patient to call for questions or concerns

## 2023-11-17 ENCOUNTER — OFFICE VISIT (OUTPATIENT)
Dept: PRIMARY CARE | Facility: CLINIC | Age: 33
End: 2023-11-17
Payer: COMMERCIAL

## 2023-11-17 VITALS
WEIGHT: 235 LBS | BODY MASS INDEX: 38.81 KG/M2 | SYSTOLIC BLOOD PRESSURE: 122 MMHG | DIASTOLIC BLOOD PRESSURE: 84 MMHG | HEART RATE: 103 BPM

## 2023-11-17 DIAGNOSIS — G47.00 INSOMNIA, UNSPECIFIED TYPE: Primary | ICD-10-CM

## 2023-11-17 DIAGNOSIS — G90.A POTS (POSTURAL ORTHOSTATIC TACHYCARDIA SYNDROME): ICD-10-CM

## 2023-11-17 PROCEDURE — 99214 OFFICE O/P EST MOD 30 MIN: CPT | Performed by: FAMILY MEDICINE

## 2023-11-17 PROCEDURE — 1036F TOBACCO NON-USER: CPT | Performed by: FAMILY MEDICINE

## 2023-11-17 RX ORDER — ZOLPIDEM TARTRATE 10 MG/1
10 TABLET ORAL NIGHTLY
Qty: 30 TABLET | Refills: 0 | Status: SHIPPED | OUTPATIENT
Start: 2024-01-16 | End: 2024-02-16 | Stop reason: SDUPTHER

## 2023-11-17 RX ORDER — ZOLPIDEM TARTRATE 10 MG/1
10 TABLET ORAL NIGHTLY
Qty: 30 TABLET | Refills: 0 | Status: SHIPPED | OUTPATIENT
Start: 2023-12-17 | End: 2024-02-16 | Stop reason: SDUPTHER

## 2023-11-17 RX ORDER — ZOLPIDEM TARTRATE 10 MG/1
10 TABLET ORAL NIGHTLY
Qty: 30 TABLET | Refills: 0 | Status: SHIPPED | OUTPATIENT
Start: 2023-11-17 | End: 2024-02-16 | Stop reason: SDUPTHER

## 2023-11-17 ASSESSMENT — ENCOUNTER SYMPTOMS
EYE DISCHARGE: 0
ACTIVITY CHANGE: 0
FACIAL SWELLING: 0
WHEEZING: 0
COLOR CHANGE: 0
CONFUSION: 0
APPETITE CHANGE: 0
ARTHRALGIAS: 0
FEVER: 0
DIARRHEA: 0
SLEEP DISTURBANCE: 1
CONSTIPATION: 0
CHILLS: 0
PALPITATIONS: 0
COUGH: 0

## 2023-11-17 NOTE — PROGRESS NOTES
Subjective   Patient ID: Abelino Dejesus is a 33 y.o. adult who presents for Med Refill.    HPI   Patient reports he has had insomnia for quite some time.  Reports that he has tried several medications in the past without success this medication was started with his primary care doctor in [place] or [place]. Dr. Downey had started this in [place]. Tyelnol PM, Lunesta, sonata, melatonin, Zquil without relief. He was concerned that all of these were giving him restless leg syndrome.     Review of Systems   Constitutional:  Negative for activity change, appetite change, chills and fever.   HENT:  Negative for congestion, ear pain and facial swelling.    Eyes:  Negative for discharge.   Respiratory:  Negative for cough and wheezing.    Cardiovascular:  Negative for chest pain, palpitations and leg swelling.   Gastrointestinal:  Negative for constipation and diarrhea.   Musculoskeletal:  Negative for arthralgias.   Skin:  Negative for color change and pallor.   Neurological:  Negative for syncope.   Psychiatric/Behavioral:  Positive for sleep disturbance. Negative for confusion.        Objective   /84 (BP Location: Right arm)   Pulse 103   Wt 107 kg (235 lb)   BMI 38.81 kg/m²   BSA Body surface area is 2.22 meters squared.      Physical Exam  Constitutional:       General: He is not in acute distress.     Appearance: Normal appearance. He is not toxic-appearing.   HENT:      Head: Normocephalic.      Right Ear: Tympanic membrane, ear canal and external ear normal.      Left Ear: Tympanic membrane, ear canal and external ear normal.   Eyes:      Conjunctiva/sclera: Conjunctivae normal.      Pupils: Pupils are equal, round, and reactive to light.   Cardiovascular:      Rate and Rhythm: Normal rate and regular rhythm.      Pulses: Normal pulses.      Heart sounds: Normal heart sounds.   Pulmonary:      Effort: No respiratory distress.      Breath sounds: No wheezing, rhonchi or rales.   Musculoskeletal:         General:  No swelling or tenderness.      Cervical back: No tenderness.   Skin:     Findings: No lesion or rash.   Neurological:      General: No focal deficit present.      Mental Status: He is alert and oriented to person, place, and time. Mental status is at baseline.      Gait: Gait normal.   Psychiatric:         Mood and Affect: Mood normal.         Behavior: Behavior normal.         Thought Content: Thought content normal.         Judgment: Judgment normal.       Lab on 05/20/2023   Component Date Value Ref Range Status    Tobacco Screen, Urine 05/20/2023 NEGATIVE   Final    Cotinine, a metabolite of nicotine, is measured to screen   for nicotine exposure. The cut-off is set at 300ng/mL to   detect active exposure (smoking).    This test was developed and its performance characteristics   were determined by the [facility].    TSH 05/20/2023 3.29  0.44 - 3.98 mIU/L Final     TSH testing is performed using different testing    methodology at [facility] than at other    [facility]. Direct result comparisons should    only be made within the same method.    Cholesterol 05/20/2023 128  0 - 199 mg/dL Final    .      AGE      DESIRABLE   BORDERLINE HIGH   HIGH     0-19 Y     0 - 169       170 - 199     >/= 200    20-24 Y     0 - 189       190 - 224     >/= 225         >24 Y     0 - 199       200 - 239     >/= 240   **All ranges are based on fasting samples. Specific   therapeutic targets will vary based on patient-specific   cardiac risk.  .   Pediatric guidelines reference:Pediatrics 2011, 128(S5).   Adult guidelines reference: NCEP ATPIII Guidelines,     YASMIN 2001, 258:2486-97  .   Venipuncture immediately after or during the    administration of Metamizole may lead to falsely   low results. Testing should be performed immediately   prior to Metamizole dosing.    HDL 05/20/2023 47.8  mg/dL Final    .      AGE      VERY LOW   LOW     NORMAL    HIGH        0-19 Y       < 35   < 40     40-45     ----    20-24 Y       ----   < 40       >45     ----      >24 Y       ----   < 40     40-60      >60  .    Cholesterol/HDL Ratio 05/20/2023 2.7   Final    REF VALUES  DESIRABLE  < 3.4  HIGH RISK  > 5.0    LDL 05/20/2023 70  0 - 99 mg/dL Final    .                           NEAR      BORD      AGE      DESIRABLE  OPTIMAL    HIGH     HIGH     VERY HIGH     0-19 Y     0 - 109     ---    110-129   >/= 130     ----    20-24 Y     0 - 119     ---    120-159   >/= 160     ----      >24 Y     0 -  99   100-129  130-159   160-189     >/=190  .    VLDL 05/20/2023 10  0 - 40 mg/dL Final    Triglycerides 05/20/2023 50  0 - 149 mg/dL Final    .      AGE      DESIRABLE   BORDERLINE HIGH   HIGH     VERY HIGH   0 D-90 D    19 - 174         ----         ----        ----  91 D- 9 Y     0 -  74        75 -  99     >/= 100      ----    10-19 Y     0 -  89        90 - 129     >/= 130      ----    20-24 Y     0 - 114       115 - 149     >/= 150      ----         >24 Y     0 - 149       150 - 199    200- 499    >/= 500  .   Venipuncture immediately after or during the    administration of Metamizole may lead to falsely   low results. Testing should be performed immediately   prior to Metamizole dosing.    Glucose 05/20/2023 96  74 - 99 mg/dL Final    Sodium 05/20/2023 138  136 - 145 mmol/L Final    Potassium 05/20/2023 4.3  3.5 - 5.3 mmol/L Final    Chloride 05/20/2023 108 (H)  98 - 107 mmol/L Final    Bicarbonate 05/20/2023 24  21 - 32 mmol/L Final    Anion Gap 05/20/2023 10  10 - 20 mmol/L Final    Urea Nitrogen 05/20/2023 13  6 - 23 mg/dL Final    Creatinine 05/20/2023 0.88  0.50 - 1.05 mg/dL Final    GFR Female 05/20/2023 88  >90 mL/min/1.73m2 Corrected     CALCULATIONS OF ESTIMATED GFR ARE PERFORMED   USING THE 2021 CKD-EPI STUDY REFIT EQUATION   WITHOUT THE RACE VARIABLE FOR THE IDMS-TRACEABLE   CREATININE METHODS.    https://jasn.asnjournals.org/content/early/2021/09/22/ASN.[email or web address]  This is  a corrected result. Previous value was DNR, verified at 06/14/2023   10:00    Calcium 05/20/2023 9.0  8.6 - 10.6 mg/dL Final    Albumin 05/20/2023 4.3  3.4 - 5.0 g/dL Final    Alkaline Phosphatase 05/20/2023 67  33 - 110 U/L Final    Total Protein 05/20/2023 7.1  6.4 - 8.2 g/dL Final    AST 05/20/2023 17  9 - 39 U/L Final    Total Bilirubin 05/20/2023 0.3  0.0 - 1.2 mg/dL Final    ALT (SGPT) 05/20/2023 21  7 - 45 U/L Final     Patients treated with Sulfasalazine may generate    falsely decreased results for ALT.    WBC 05/20/2023 7.0  4.4 - 11.3 x10E9/L Final    nRBC 05/20/2023 0.0  0.0 - 0.0 /100 WBC Final    RBC 05/20/2023 4.52  4.00 - 5.20 x10E12/L Final    Hemoglobin 05/20/2023 14.3  12.0 - 16.0 g/dL Final    Hematocrit 05/20/2023 42.9  36.0 - 46.0 % Final    MCV 05/20/2023 95  80 - 100 fL Final    MCHC 05/20/2023 33.3  32.0 - 36.0 g/dL Final    Platelets 05/20/2023 266  150 - 450 x10E9/L Final    RDW 05/20/2023 12.2  11.5 - 14.5 % Final    Neutrophils % 05/20/2023 51.5  40.0 - 80.0 % Final    Immature Granulocytes %, Automated 05/20/2023 0.1  0.0 - 0.9 % Final     Immature Granulocyte Count (IG) includes promyelocytes,    myelocytes and metamyelocytes but does not include bands.   Percent differential counts (%) should be interpreted in the   context of the absolute cell counts (cells/L).    Lymphocytes % 05/20/2023 37.7  13.0 - 44.0 % Final    Monocytes % 05/20/2023 8.1  2.0 - 10.0 % Final    Eosinophils % 05/20/2023 1.7  0.0 - 6.0 % Final    Basophils % 05/20/2023 0.9  0.0 - 2.0 % Final    Neutrophils Absolute 05/20/2023 3.62  1.20 - 7.70 x10E9/L Final    Lymphocytes Absolute 05/20/2023 2.65  1.20 - 4.80 x10E9/L Final    Monocytes Absolute 05/20/2023 0.57  0.10 - 1.00 x10E9/L Final    Eosinophils Absolute 05/20/2023 0.12  0.00 - 0.70 x10E9/L Final    Basophils Absolute 05/20/2023 0.06  0.00 - 0.10 x10E9/L Final   Office Visit on 05/17/2023   Component Date Value Ref Range Status    DRUG SCREEN COMMENT URINE  05/20/2023 SEE BELOW   Final    Drug screen results are presumptive and should not be used to assess   compliance with prescribed medication. Definitive confirmatory drug testing   has been added to this sample for any positive screen result and will be   reported separately.   .  Toxicology screening results are reported qualitatively. The concentration   must be greater than or equal to the cutoff to be reported as positive. The   concentration at which the screening test can detect an individual drug or   metabolite varies. The absence of expected drug(s) and/or drug metabolite(s)   may indicate non-compliance, inappropriate timing of specimen collection   relative to drug administration, poor drug absorption, diluted/adulterated   urine, or limitations of testing. For medical purposes only; not valid for   forensic use.   .  Interpretive questions should be directed to the laboratory medical   directors.      Amphetamine Screen, Urine 05/20/2023 PRESUMPTIVE NEGATIVE  NEGATIVE Final     CUTOFF LEVEL:  500 NG/ML   Cross-reactivity has been reported with high concentrations   of the following drugs: buproprion, chloroquine, chlorpromazine,   ephedrine, mephentermine, fenfluramine, phentermine,   phenylpropanolamine, pseudoephedrine, and propranolol.    Barbiturate Screen, Urine 05/20/2023 PRESUMPTIVE NEGATIVE  NEGATIVE Final     CUTOFF LEVEL: 200 NG/ML    BENZODIAZEPINE (PRESENCE) IN URINE* 05/20/2023 PRESUMPTIVE NEGATIVE  NEGATIVE Final     CUTOFF LEVEL: 200 NG/ML    Cannabinoid Screen, Urine 05/20/2023 PRESUMPTIVE NEGATIVE  NEGATIVE Final     CUTOFF LEVEL: 50 NG/ML    Cocaine Screen, Urine 05/20/2023 PRESUMPTIVE NEGATIVE  NEGATIVE Final     CUTOFF LEVEL: 150 NG/ML    Fentanyl, Ur 05/20/2023 PRESUMPTIVE NEGATIVE  NEGATIVE Final     CUTOFF LEVEL:  5 NG/ML    Methadone Screen, Urine 05/20/2023 PRESUMPTIVE NEGATIVE  NEGATIVE Final     CUTOFF LEVEL: 150 NG/ML   The metabolite L-alpha-acetylmethadol (LAAM) is not    detected by this method in concentrations that would   be found in the urine of patients on LAAM therapy.    Opiate Screen, Urine 05/20/2023 PRESUMPTIVE NEGATIVE  NEGATIVE Final     CUTOFF LEVEL: 300 NG/ML   The opiate screen does not detect fentanyl, meperidine, or    tramadol. Oxycodone is not consistently detected (refer to   Oxycodone Screen, Urine result).    Oxycodone Screen, Ur 05/20/2023 PRESUMPTIVE NEGATIVE  NEGATIVE Final     CUTOFF LEVEL: 100 NG/ML    This test will accurately detect both oxycodone and oxymorphone.         PCP Screen, Urine 05/20/2023 PRESUMPTIVE NEGATIVE  NEGATIVE Final     CUTOFF LEVEL:  25 NG/ML   Cross-reactivity has been reported with dextromethorphan.   [facility]acy Encounter on 12/12/2022   Component Date Value Ref Range Status    SARS-CoV-2 Result 12/12/2022 NOT DETECTED  Not Detected Final    Comment: .  This test has received FDA Emergency Use Authorization (EUA) and has been   verified by [facility]. This test is only   authorized for the duration of time that circumstances exist to justify the   authorization of the emergency use of in vitro diagnostic tests for the   detection of SARS-CoV-2 virus and/or diagnosis of COVID-19 infection under   section 564(b)(1) of the Act, 21 U.S.C. 360bbb-3(b)(1), unless the   authorization is terminated or revoked sooner.     [facility] is certified under CLIA-88 as   qualified to perform high complexity testing. Testing is performed in the   [facility] laboratory located at [address] [place] [address].    SARS-CoV-2/Flu/RSV Multiplex Test:   Fact sheet for providers: https://www.fda.gov/media/440815/download  Fact sheet for patients: https://www.fda.gov/media/089036/download       Current Outpatient Medications on File Prior to Visit   Medication Sig Dispense Refill    metoprolol tartrate (Lopressor) 25 mg tablet Take 1 tablet (25 mg) by mouth 2 times a day.  60 tablet 5    zolpidem (Ambien) 10 mg tablet Take 1 tablet (10 mg) by mouth once daily at bedtime. 30 tablet 0    zolpidem (Ambien) 10 mg tablet Take 1 tablet (10 mg) by mouth once daily at bedtime. Do not start before June 23, 2023. 30 tablet 0    zolpidem (Ambien) 10 mg tablet Take 1 tablet (10 mg) by mouth once daily at bedtime. 30 tablet 0    zolpidem (Ambien) 10 mg tablet Take 1 tablet (10 mg) by mouth once daily at bedtime. Do not start before September 15, 2023. 30 tablet 0    [DISCONTINUED] zolpidem (Ambien) 10 mg tablet Take 1 tablet (10 mg) by mouth once daily at bedtime. Do not start before October 15, 2023. 30 tablet 0     No current facility-administered medications on file prior to visit.     No images are attached to the encounter.      OARRS:  Lilly Grimaldo DO on 11/17/2023  1:04 PM  I have personally reviewed the OARRS report for Álvaro Dejesus. I have considered the risks of abuse, dependence, addiction and diversion    Is the patient prescribed a combination of a benzodiazepine and opioid?  No    Last Urine Drug Screen / ordered today: No  Recent Results (from the past 8760 hour(s))   Drug Screen, Urine With Reflex to Confirmation    Collection Time: 05/20/23  9:28 AM   Result Value Ref Range    DRUG SCREEN COMMENT URINE SEE BELOW     Amphetamine Screen, Urine PRESUMPTIVE NEGATIVE NEGATIVE    Barbiturate Screen, Urine PRESUMPTIVE NEGATIVE NEGATIVE    BENZODIAZEPINE (PRESENCE) IN URINE BY SCREEN METHOD PRESUMPTIVE NEGATIVE NEGATIVE    Cannabinoid Screen, Urine PRESUMPTIVE NEGATIVE NEGATIVE    Cocaine Screen, Urine PRESUMPTIVE NEGATIVE NEGATIVE    Fentanyl, Ur PRESUMPTIVE NEGATIVE NEGATIVE    Methadone Screen, Urine PRESUMPTIVE NEGATIVE NEGATIVE    Opiate Screen, Urine PRESUMPTIVE NEGATIVE NEGATIVE    Oxycodone Screen, Ur PRESUMPTIVE NEGATIVE NEGATIVE    PCP Screen, Urine PRESUMPTIVE NEGATIVE NEGATIVE     Results are as expected.     Controlled Substance Agreement:  Date of the Last Agreement:  5/24/2023  Reviewed Controlled Substance Agreement including but not limited to the benefits, risks, and alternatives to treatment with a Controlled Substance medication(s).    Sleep Aids:   What is the patient's goal of therapy? Better sleep  Is this being achieved with current treatment? yes    Activities of Daily Living:   Is your overall impression that this patient is benefiting (symptom reduction outweighs side effects) from sleep aid therapy? Yes     1. Physical Functioning: Same  2. Family Relationship: Same  3. Social Relationship: Same  4. Mood: Same  5. Sleep Patterns: Same  6. Overall Function: Same        Assessment/Plan   Diagnoses and all orders for this visit:  Insomnia, unspecified type  -     zolpidem (Ambien) 10 mg tablet; Take 1 tablet (10 mg) by mouth once daily at bedtime.  -     zolpidem (Ambien) 10 mg tablet; Take 1 tablet (10 mg) by mouth once daily at bedtime. Do not start before December 17, 2023.  -     zolpidem (Ambien) 10 mg tablet; Take 1 tablet (10 mg) by mouth once daily at bedtime. Do not start before January 16, 2024.  POTS (postural orthostatic tachycardia syndrome)  1.  Patient realizes he has to come in every 90 days to have this medication renewed  2.  Patient did sign Ambien contract 5/24/2023  3.  Patient's urine drug screen performed 5/20/2023  4.  Patient to call for questions or concerns

## 2024-02-16 ENCOUNTER — OFFICE VISIT (OUTPATIENT)
Dept: PRIMARY CARE | Facility: CLINIC | Age: 34
End: 2024-02-16
Payer: COMMERCIAL

## 2024-02-16 VITALS
BODY MASS INDEX: 39.14 KG/M2 | SYSTOLIC BLOOD PRESSURE: 120 MMHG | WEIGHT: 237 LBS | TEMPERATURE: 98.1 F | HEART RATE: 100 BPM | DIASTOLIC BLOOD PRESSURE: 74 MMHG

## 2024-02-16 DIAGNOSIS — G47.00 INSOMNIA, UNSPECIFIED TYPE: Primary | ICD-10-CM

## 2024-02-16 PROCEDURE — 1036F TOBACCO NON-USER: CPT | Performed by: FAMILY MEDICINE

## 2024-02-16 PROCEDURE — 99214 OFFICE O/P EST MOD 30 MIN: CPT | Performed by: FAMILY MEDICINE

## 2024-02-16 RX ORDER — ZOLPIDEM TARTRATE 10 MG/1
10 TABLET ORAL NIGHTLY
Qty: 30 TABLET | Refills: 0 | Status: SHIPPED | OUTPATIENT
Start: 2024-02-16 | End: 2024-05-15 | Stop reason: WASHOUT

## 2024-02-16 RX ORDER — ZOLPIDEM TARTRATE 10 MG/1
10 TABLET ORAL NIGHTLY
Qty: 30 TABLET | Refills: 0 | Status: SHIPPED | OUTPATIENT
Start: 2024-03-17 | End: 2024-05-15 | Stop reason: WASHOUT

## 2024-02-16 RX ORDER — ZOLPIDEM TARTRATE 10 MG/1
10 TABLET ORAL NIGHTLY
Qty: 30 TABLET | Refills: 0 | Status: SHIPPED | OUTPATIENT
Start: 2024-04-16 | End: 2024-05-15 | Stop reason: SDUPTHER

## 2024-02-16 ASSESSMENT — ENCOUNTER SYMPTOMS
SLEEP DISTURBANCE: 1
DIARRHEA: 0
FACIAL SWELLING: 0
FEVER: 0
ACTIVITY CHANGE: 0
WHEEZING: 0
COLOR CHANGE: 0
CONFUSION: 0
CHILLS: 0
PALPITATIONS: 0
ARTHRALGIAS: 0
APPETITE CHANGE: 0
CONSTIPATION: 0
COUGH: 0
EYE DISCHARGE: 0

## 2024-02-16 ASSESSMENT — PATIENT HEALTH QUESTIONNAIRE - PHQ9
2. FEELING DOWN, DEPRESSED OR HOPELESS: NOT AT ALL
10. IF YOU CHECKED OFF ANY PROBLEMS, HOW DIFFICULT HAVE THESE PROBLEMS MADE IT FOR YOU TO DO YOUR WORK, TAKE CARE OF THINGS AT HOME, OR GET ALONG WITH OTHER PEOPLE: NOT DIFFICULT AT ALL
SUM OF ALL RESPONSES TO PHQ9 QUESTIONS 1 AND 2: 0
1. LITTLE INTEREST OR PLEASURE IN DOING THINGS: NOT AT ALL

## 2024-02-16 NOTE — PROGRESS NOTES
Subjective   Patient ID: Abelino Dejesus is a 33 y.o. adult who presents for Med Refill.    Med Refill  Pertinent negatives include no arthralgias, chest pain, chills, congestion, coughing or fever.      Patient reports he has had insomnia for quite some time.  Reports that he has tried several medications in the past without success this medication was started with his primary care doctor in [place] or [place]. Dr. Downey had started this in [place]. Tyelnol PM, Lunesta, sonata, melatonin, Zquil without relief. He was concerned that all of these were giving him restless leg syndrome.     Review of Systems   Constitutional:  Negative for activity change, appetite change, chills and fever.   HENT:  Negative for congestion, ear pain and facial swelling.    Eyes:  Negative for discharge.   Respiratory:  Negative for cough and wheezing.    Cardiovascular:  Negative for chest pain, palpitations and leg swelling.   Gastrointestinal:  Negative for constipation and diarrhea.   Musculoskeletal:  Negative for arthralgias.   Skin:  Negative for color change and pallor.   Neurological:  Negative for syncope.   Psychiatric/Behavioral:  Positive for sleep disturbance. Negative for confusion.        Objective   /74 (BP Location: Right arm)   Pulse 100   Temp 36.7 °C (98.1 °F)   Wt 108 kg (237 lb)   BMI 39.14 kg/m²   BSA Body surface area is 2.23 meters squared.      Physical Exam  Constitutional:       General: He is not in acute distress.     Appearance: Normal appearance. He is not toxic-appearing.   HENT:      Head: Normocephalic.      Right Ear: Tympanic membrane, ear canal and external ear normal.      Left Ear: Tympanic membrane, ear canal and external ear normal.   Eyes:      Conjunctiva/sclera: Conjunctivae normal.      Pupils: Pupils are equal, round, and reactive to light.   Cardiovascular:      Rate and Rhythm: Normal rate and regular rhythm.      Pulses: Normal pulses.      Heart sounds: Normal heart sounds.    Pulmonary:      Effort: No respiratory distress.      Breath sounds: No wheezing, rhonchi or rales.   Musculoskeletal:         General: No swelling or tenderness.      Cervical back: No tenderness.   Skin:     Findings: No lesion or rash.   Neurological:      General: No focal deficit present.      Mental Status: He is alert and oriented to person, place, and time. Mental status is at baseline.      Gait: Gait normal.   Psychiatric:         Mood and Affect: Mood normal.         Behavior: Behavior normal.         Thought Content: Thought content normal.         Judgment: Judgment normal.       Lab on 05/20/2023   Component Date Value Ref Range Status    Tobacco Screen, Urine 05/20/2023 NEGATIVE   Final    Cotinine, a metabolite of nicotine, is measured to screen   for nicotine exposure. The cut-off is set at 300ng/mL to   detect active exposure (smoking).    This test was developed and its performance characteristics   were determined by the [facility].    TSH 05/20/2023 3.29  0.44 - 3.98 mIU/L Final     TSH testing is performed using different testing    methodology at [facility] than at other    [facility]. Direct result comparisons should    only be made within the same method.    Cholesterol 05/20/2023 128  0 - 199 mg/dL Final    .      AGE      DESIRABLE   BORDERLINE HIGH   HIGH     0-19 Y     0 - 169       170 - 199     >/= 200    20-24 Y     0 - 189       190 - 224     >/= 225         >24 Y     0 - 199       200 - 239     >/= 240   **All ranges are based on fasting samples. Specific   therapeutic targets will vary based on patient-specific   cardiac risk.  .   Pediatric guidelines reference:Pediatrics 2011, 128(S5).   Adult guidelines reference: NCEP ATPIII Guidelines,     YASMIN 2001, 258:2486-97  .   Venipuncture immediately after or during the    administration of Metamizole may lead to falsely   low results. Testing should be performed  immediately   prior to Metamizole dosing.    HDL 05/20/2023 47.8  mg/dL Final    .      AGE      VERY LOW   LOW     NORMAL    HIGH       0-19 Y       < 35   < 40     40-45     ----    20-24 Y       ----   < 40       >45     ----      >24 Y       ----   < 40     40-60      >60  .    Cholesterol/HDL Ratio 05/20/2023 2.7   Final    REF VALUES  DESIRABLE  < 3.4  HIGH RISK  > 5.0    LDL 05/20/2023 70  0 - 99 mg/dL Final    .                           NEAR      BORD      AGE      DESIRABLE  OPTIMAL    HIGH     HIGH     VERY HIGH     0-19 Y     0 - 109     ---    110-129   >/= 130     ----    20-24 Y     0 - 119     ---    120-159   >/= 160     ----      >24 Y     0 -  99   100-129  130-159   160-189     >/=190  .    VLDL 05/20/2023 10  0 - 40 mg/dL Final    Triglycerides 05/20/2023 50  0 - 149 mg/dL Final    .      AGE      DESIRABLE   BORDERLINE HIGH   HIGH     VERY HIGH   0 D-90 D    19 - 174         ----         ----        ----  91 D- 9 Y     0 -  74        75 -  99     >/= 100      ----    10-19 Y     0 -  89        90 - 129     >/= 130      ----    20-24 Y     0 - 114       115 - 149     >/= 150      ----         >24 Y     0 - 149       150 - 199    200- 499    >/= 500  .   Venipuncture immediately after or during the    administration of Metamizole may lead to falsely   low results. Testing should be performed immediately   prior to Metamizole dosing.    Glucose 05/20/2023 96  74 - 99 mg/dL Final    Sodium 05/20/2023 138  136 - 145 mmol/L Final    Potassium 05/20/2023 4.3  3.5 - 5.3 mmol/L Final    Chloride 05/20/2023 108 (H)  98 - 107 mmol/L Final    Bicarbonate 05/20/2023 24  21 - 32 mmol/L Final    Anion Gap 05/20/2023 10  10 - 20 mmol/L Final    Urea Nitrogen 05/20/2023 13  6 - 23 mg/dL Final    Creatinine 05/20/2023 0.88  0.50 - 1.05 mg/dL Final    GFR Female 05/20/2023 88  >90 mL/min/1.73m2 Corrected     CALCULATIONS OF ESTIMATED GFR ARE PERFORMED   USING THE 2021 CKD-EPI STUDY REFIT EQUATION   WITHOUT THE  RACE VARIABLE FOR THE IDMS-TRACEABLE   CREATININE METHODS.    https://jasn.asnjournals.org/content/early/2021/09/22/ASN.[email or web address]  This is a corrected result. Previous value was DNR, verified at 06/14/2023   10:00    Calcium 05/20/2023 9.0  8.6 - 10.6 mg/dL Final    Albumin 05/20/2023 4.3  3.4 - 5.0 g/dL Final    Alkaline Phosphatase 05/20/2023 67  33 - 110 U/L Final    Total Protein 05/20/2023 7.1  6.4 - 8.2 g/dL Final    AST 05/20/2023 17  9 - 39 U/L Final    Total Bilirubin 05/20/2023 0.3  0.0 - 1.2 mg/dL Final    ALT (SGPT) 05/20/2023 21  7 - 45 U/L Final     Patients treated with Sulfasalazine may generate    falsely decreased results for ALT.    WBC 05/20/2023 7.0  4.4 - 11.3 x10E9/L Final    nRBC 05/20/2023 0.0  0.0 - 0.0 /100 WBC Final    RBC 05/20/2023 4.52  4.00 - 5.20 x10E12/L Final    Hemoglobin 05/20/2023 14.3  12.0 - 16.0 g/dL Final    Hematocrit 05/20/2023 42.9  36.0 - 46.0 % Final    MCV 05/20/2023 95  80 - 100 fL Final    MCHC 05/20/2023 33.3  32.0 - 36.0 g/dL Final    Platelets 05/20/2023 266  150 - 450 x10E9/L Final    RDW 05/20/2023 12.2  11.5 - 14.5 % Final    Neutrophils % 05/20/2023 51.5  40.0 - 80.0 % Final    Immature Granulocytes %, Automated 05/20/2023 0.1  0.0 - 0.9 % Final     Immature Granulocyte Count (IG) includes promyelocytes,    myelocytes and metamyelocytes but does not include bands.   Percent differential counts (%) should be interpreted in the   context of the absolute cell counts (cells/L).    Lymphocytes % 05/20/2023 37.7  13.0 - 44.0 % Final    Monocytes % 05/20/2023 8.1  2.0 - 10.0 % Final    Eosinophils % 05/20/2023 1.7  0.0 - 6.0 % Final    Basophils % 05/20/2023 0.9  0.0 - 2.0 % Final    Neutrophils Absolute 05/20/2023 3.62  1.20 - 7.70 x10E9/L Final    Lymphocytes Absolute 05/20/2023 2.65  1.20 - 4.80 x10E9/L Final    Monocytes Absolute 05/20/2023 0.57  0.10 - 1.00 x10E9/L Final    Eosinophils Absolute 05/20/2023 0.12  0.00 - 0.70 x10E9/L Final    Basophils Absolute  05/20/2023 0.06  0.00 - 0.10 x10E9/L Final   Office Visit on 05/17/2023   Component Date Value Ref Range Status    DRUG SCREEN COMMENT URINE 05/20/2023 SEE BELOW   Final    Drug screen results are presumptive and should not be used to assess   compliance with prescribed medication. Definitive confirmatory drug testing   has been added to this sample for any positive screen result and will be   reported separately.   .  Toxicology screening results are reported qualitatively. The concentration   must be greater than or equal to the cutoff to be reported as positive. The   concentration at which the screening test can detect an individual drug or   metabolite varies. The absence of expected drug(s) and/or drug metabolite(s)   may indicate non-compliance, inappropriate timing of specimen collection   relative to drug administration, poor drug absorption, diluted/adulterated   urine, or limitations of testing. For medical purposes only; not valid for   forensic use.   .  Interpretive questions should be directed to the laboratory medical   directors.      Amphetamine Screen, Urine 05/20/2023 PRESUMPTIVE NEGATIVE  NEGATIVE Final     CUTOFF LEVEL:  500 NG/ML   Cross-reactivity has been reported with high concentrations   of the following drugs: buproprion, chloroquine, chlorpromazine,   ephedrine, mephentermine, fenfluramine, phentermine,   phenylpropanolamine, pseudoephedrine, and propranolol.    Barbiturate Screen, Urine 05/20/2023 PRESUMPTIVE NEGATIVE  NEGATIVE Final     CUTOFF LEVEL: 200 NG/ML    BENZODIAZEPINE (PRESENCE) IN URINE* 05/20/2023 PRESUMPTIVE NEGATIVE  NEGATIVE Final     CUTOFF LEVEL: 200 NG/ML    Cannabinoid Screen, Urine 05/20/2023 PRESUMPTIVE NEGATIVE  NEGATIVE Final     CUTOFF LEVEL: 50 NG/ML    Cocaine Screen, Urine 05/20/2023 PRESUMPTIVE NEGATIVE  NEGATIVE Final     CUTOFF LEVEL: 150 NG/ML    Fentanyl, Ur 05/20/2023 PRESUMPTIVE NEGATIVE  NEGATIVE Final     CUTOFF LEVEL:  5 NG/ML    Methadone Screen,  Urine 05/20/2023 PRESUMPTIVE NEGATIVE  NEGATIVE Final     CUTOFF LEVEL: 150 NG/ML   The metabolite L-alpha-acetylmethadol (LAAM) is not   detected by this method in concentrations that would   be found in the urine of patients on LAAM therapy.    Opiate Screen, Urine 05/20/2023 PRESUMPTIVE NEGATIVE  NEGATIVE Final     CUTOFF LEVEL: 300 NG/ML   The opiate screen does not detect fentanyl, meperidine, or    tramadol. Oxycodone is not consistently detected (refer to   Oxycodone Screen, Urine result).    Oxycodone Screen, Ur 05/20/2023 PRESUMPTIVE NEGATIVE  NEGATIVE Final     CUTOFF LEVEL: 100 NG/ML    This test will accurately detect both oxycodone and oxymorphone.         PCP Screen, Urine 05/20/2023 PRESUMPTIVE NEGATIVE  NEGATIVE Final     CUTOFF LEVEL:  25 NG/ML   Cross-reactivity has been reported with dextromethorphan.     Current Outpatient Medications on File Prior to Visit   Medication Sig Dispense Refill    metoprolol tartrate (Lopressor) 25 mg tablet Take 1 tablet (25 mg) by mouth 2 times a day. 60 tablet 5    zolpidem (Ambien) 10 mg tablet Take 1 tablet (10 mg) by mouth once daily at bedtime. Do not start before September 15, 2023. 30 tablet 0    zolpidem (Ambien) 10 mg tablet Take 1 tablet (10 mg) by mouth once daily at bedtime. 30 tablet 0    [DISCONTINUED] zolpidem (Ambien) 10 mg tablet Take 1 tablet (10 mg) by mouth once daily at bedtime. 30 tablet 0    [DISCONTINUED] zolpidem (Ambien) 10 mg tablet Take 1 tablet (10 mg) by mouth once daily at bedtime. Do not start before June 23, 2023. 30 tablet 0    [DISCONTINUED] zolpidem (Ambien) 10 mg tablet Take 1 tablet (10 mg) by mouth once daily at bedtime. 30 tablet 0    [DISCONTINUED] zolpidem (Ambien) 10 mg tablet Take 1 tablet (10 mg) by mouth once daily at bedtime. Do not start before December 17, 2023. 30 tablet 0    [DISCONTINUED] zolpidem (Ambien) 10 mg tablet Take 1 tablet (10 mg) by mouth once daily at bedtime. Do not start before January 16, 2024. 30  tablet 0     No current facility-administered medications on file prior to visit.     No images are attached to the encounter.      OARRS:  Lilly Grimaldo, DO on 2/16/2024  3:18 PM  I have personally reviewed the OARRS report for Álvaro Dejesus. I have considered the risks of abuse, dependence, addiction and diversion    Is the patient prescribed a combination of a benzodiazepine and opioid?  No    Last Urine Drug Screen / ordered today: No  Recent Results (from the past 8760 hour(s))   Drug Screen, Urine With Reflex to Confirmation    Collection Time: 05/20/23  9:28 AM   Result Value Ref Range    DRUG SCREEN COMMENT URINE SEE BELOW     Amphetamine Screen, Urine PRESUMPTIVE NEGATIVE NEGATIVE    Barbiturate Screen, Urine PRESUMPTIVE NEGATIVE NEGATIVE    BENZODIAZEPINE (PRESENCE) IN URINE BY SCREEN METHOD PRESUMPTIVE NEGATIVE NEGATIVE    Cannabinoid Screen, Urine PRESUMPTIVE NEGATIVE NEGATIVE    Cocaine Screen, Urine PRESUMPTIVE NEGATIVE NEGATIVE    Fentanyl, Ur PRESUMPTIVE NEGATIVE NEGATIVE    Methadone Screen, Urine PRESUMPTIVE NEGATIVE NEGATIVE    Opiate Screen, Urine PRESUMPTIVE NEGATIVE NEGATIVE    Oxycodone Screen, Ur PRESUMPTIVE NEGATIVE NEGATIVE    PCP Screen, Urine PRESUMPTIVE NEGATIVE NEGATIVE     Results are as expected.     Controlled Substance Agreement:  Date of the Last Agreement: 5/24/2023  Reviewed Controlled Substance Agreement including but not limited to the benefits, risks, and alternatives to treatment with a Controlled Substance medication(s).    Sleep Aids:   What is the patient's goal of therapy? Better sleep  Is this being achieved with current treatment? yes    Activities of Daily Living:   Is your overall impression that this patient is benefiting (symptom reduction outweighs side effects) from sleep aid therapy? Yes     1. Physical Functioning: Same  2. Family Relationship: Same  3. Social Relationship: Same  4. Mood: Same  5. Sleep Patterns: Same  6. Overall Function:  Same        Assessment/Plan   Diagnoses and all orders for this visit:  Insomnia, unspecified type  1.  Patient realizes he has to come in every 90 days to have this medication renewed  2.  Patient did sign Ambien contract 5/24/2023  3.  Patient's urine drug screen performed 5/20/2023  4.  Patient to call for questions or concerns

## 2024-05-15 ENCOUNTER — OFFICE VISIT (OUTPATIENT)
Dept: PRIMARY CARE | Facility: CLINIC | Age: 34
End: 2024-05-15
Payer: COMMERCIAL

## 2024-05-15 VITALS — WEIGHT: 232.2 LBS | BODY MASS INDEX: 38.34 KG/M2 | DIASTOLIC BLOOD PRESSURE: 78 MMHG | SYSTOLIC BLOOD PRESSURE: 118 MMHG

## 2024-05-15 DIAGNOSIS — R41.840 ATTENTION DISTURBANCE: ICD-10-CM

## 2024-05-15 DIAGNOSIS — G47.00 INSOMNIA, UNSPECIFIED TYPE: Primary | ICD-10-CM

## 2024-05-15 DIAGNOSIS — G90.A POTS (POSTURAL ORTHOSTATIC TACHYCARDIA SYNDROME): ICD-10-CM

## 2024-05-15 PROCEDURE — 99214 OFFICE O/P EST MOD 30 MIN: CPT | Performed by: FAMILY MEDICINE

## 2024-05-15 PROCEDURE — 1036F TOBACCO NON-USER: CPT | Performed by: FAMILY MEDICINE

## 2024-05-15 RX ORDER — ZOLPIDEM TARTRATE 10 MG/1
10 TABLET ORAL NIGHTLY
Qty: 30 TABLET | Refills: 0 | Status: SHIPPED | OUTPATIENT
Start: 2024-06-14 | End: 2024-07-14

## 2024-05-15 RX ORDER — ZOLPIDEM TARTRATE 10 MG/1
10 TABLET ORAL NIGHTLY
Qty: 30 TABLET | Refills: 0 | Status: SHIPPED | OUTPATIENT
Start: 2024-07-14 | End: 2024-08-13

## 2024-05-15 RX ORDER — ZOLPIDEM TARTRATE 10 MG/1
10 TABLET ORAL NIGHTLY
Qty: 30 TABLET | Refills: 0 | Status: SHIPPED | OUTPATIENT
Start: 2024-05-15 | End: 2024-06-14

## 2024-05-15 ASSESSMENT — ENCOUNTER SYMPTOMS
CONSTIPATION: 0
ARTHRALGIAS: 0
EYE DISCHARGE: 0
FEVER: 0
COLOR CHANGE: 0
PALPITATIONS: 0
CONFUSION: 0
FACIAL SWELLING: 0
WHEEZING: 0
SLEEP DISTURBANCE: 1
APPETITE CHANGE: 0
ACTIVITY CHANGE: 0
CHILLS: 0
DIARRHEA: 0
COUGH: 0

## 2024-05-15 NOTE — PROGRESS NOTES
Subjective   Patient ID: Abelino Dejesus is a 34 y.o. adult who presents for Med Refill.    Med Refill  Pertinent negatives include no arthralgias, chest pain, chills, congestion, coughing or fever.      Patient reports he has had insomnia for quite some time.  Reports that he has tried several medications in the past without success this medication was started with his primary care doctor in [place] or [place]. Dr. Dow[place]. Tyelnol PM, Lunesta, sonata, melatonin, Zquil without relief. He was concerned that all of these were giving him restless leg syndrome.     Review of Systems   Constitutional:  Negative for activity change, appetite change, chills and fever.   HENT:  Negative for congestion, ear pain and facial swelling.    Eyes:  Negative for discharge.   Respiratory:  Negative for cough and wheezing.    Cardiovascular:  Negative for chest pain, palpitations and leg swelling.   Gastrointestinal:  Negative for constipation and diarrhea.   Musculoskeletal:  Negative for arthralgias.   Skin:  Negative for color change and pallor.   Neurological:  Negative for syncope.   Psychiatric/Behavioral:  Positive for sleep disturbance. Negative for confusion.        Objective   /78 (BP Location: Left arm)   Wt 105 kg (232 lb 3.2 oz)   BMI 38.34 kg/m²   BSA Body surface area is 2.2 meters squared.      Physical Exam  Constitutional:       General: He is not in acute distress.     Appearance: Normal appearance. He is not toxic-appearing.   HENT:      Head: Normocephalic.      Right Ear: Tympanic membrane, ear canal and external ear normal.      Left Ear: Tympanic membrane, ear canal and external ear normal.   Eyes:      Conjunctiva/sclera: Conjunctivae normal.      Pupils: Pupils are equal, round, and reactive to light.   Cardiovascular:      Rate and Rhythm: Normal rate and regular rhythm.      Pulses: Normal pulses.      Heart sounds: Normal heart sounds.   Pulmonary:      Effort: No respiratory  distress.      Breath sounds: No wheezing, rhonchi or rales.   Musculoskeletal:         General: No swelling or tenderness.      Cervical back: No tenderness.   Skin:     Findings: No lesion or rash.   Neurological:      General: No focal deficit present.      Mental Status: He is alert and oriented to person, place, and time. Mental status is at baseline.      Gait: Gait normal.   Psychiatric:         Mood and Affect: Mood normal.         Behavior: Behavior normal.         Thought Content: Thought content normal.         Judgment: Judgment normal.       Lab on 05/20/2023   Component Date Value Ref Range Status    Tobacco Screen, Urine 05/20/2023 NEGATIVE   Final    Cotinine, a metabolite of nicotine, is measured to screen   for nicotine exposure. The cut-off is set at 300ng/mL to   detect active exposure (smoking).    This test was developed and its performance characteristics   were determined by the [facility].    TSH 05/20/2023 3.29  0.44 - 3.98 mIU/L Final     TSH testing is performed using different testing    methodology at [facility] than at other    [facility]. Direct result comparisons should    only be made within the same method.    Cholesterol 05/20/2023 128  0 - 199 mg/dL Final    .      AGE      DESIRABLE   BORDERLINE HIGH   HIGH     0-19 Y     0 - 169       170 - 199     >/= 200    20-24 Y     0 - 189       190 - 224     >/= 225         >24 Y     0 - 199       200 - 239     >/= 240   **All ranges are based on fasting samples. Specific   therapeutic targets will vary based on patient-specific   cardiac risk.  .   Pediatric guidelines reference:Pediatrics 2011, 128(S5).   Adult guidelines reference: NCEP ATPIII Guidelines,     YASMIN 2001, 258:2486-97  .   Venipuncture immediately after or during the    administration of Metamizole may lead to falsely   low results. Testing should be performed immediately   prior to Metamizole dosing.     HDL 05/20/2023 47.8  mg/dL Final    .      AGE      VERY LOW   LOW     NORMAL    HIGH       0-19 Y       < 35   < 40     40-45     ----    20-24 Y       ----   < 40       >45     ----      >24 Y       ----   < 40     40-60      >60  .    Cholesterol/HDL Ratio 05/20/2023 2.7   Final    REF VALUES  DESIRABLE  < 3.4  HIGH RISK  > 5.0    LDL 05/20/2023 70  0 - 99 mg/dL Final    .                           NEAR      BORD      AGE      DESIRABLE  OPTIMAL    HIGH     HIGH     VERY HIGH     0-19 Y     0 - 109     ---    110-129   >/= 130     ----    20-24 Y     0 - 119     ---    120-159   >/= 160     ----      >24 Y     0 -  99   100-129  130-159   160-189     >/=190  .    VLDL 05/20/2023 10  0 - 40 mg/dL Final    Triglycerides 05/20/2023 50  0 - 149 mg/dL Final    .      AGE      DESIRABLE   BORDERLINE HIGH   HIGH     VERY HIGH   0 D-90 D    19 - 174         ----         ----        ----  91 D- 9 Y     0 -  74        75 -  99     >/= 100      ----    10-19 Y     0 -  89        90 - 129     >/= 130      ----    20-24 Y     0 - 114       115 - 149     >/= 150      ----         >24 Y     0 - 149       150 - 199    200- 499    >/= 500  .   Venipuncture immediately after or during the    administration of Metamizole may lead to falsely   low results. Testing should be performed immediately   prior to Metamizole dosing.    Glucose 05/20/2023 96  74 - 99 mg/dL Final    Sodium 05/20/2023 138  136 - 145 mmol/L Final    Potassium 05/20/2023 4.3  3.5 - 5.3 mmol/L Final    Chloride 05/20/2023 108 (H)  98 - 107 mmol/L Final    Bicarbonate 05/20/2023 24  21 - 32 mmol/L Final    Anion Gap 05/20/2023 10  10 - 20 mmol/L Final    Urea Nitrogen 05/20/2023 13  6 - 23 mg/dL Final    Creatinine 05/20/2023 0.88  0.50 - 1.05 mg/dL Final    GFR Female 05/20/2023 88  >90 mL/min/1.73m2 Corrected     CALCULATIONS OF ESTIMATED GFR ARE PERFORMED   USING THE 2021 CKD-EPI STUDY REFIT EQUATION   WITHOUT THE RACE VARIABLE FOR THE IDMS-TRACEABLE    CREATININE METHODS.    https://jasn.asnjournals.org/content/early/2021/09/22/ASN.[email or web address]  This is a corrected result. Previous value was DNR, verified at 06/14/2023   10:00    Calcium 05/20/2023 9.0  8.6 - 10.6 mg/dL Final    Albumin 05/20/2023 4.3  3.4 - 5.0 g/dL Final    Alkaline Phosphatase 05/20/2023 67  33 - 110 U/L Final    Total Protein 05/20/2023 7.1  6.4 - 8.2 g/dL Final    AST 05/20/2023 17  9 - 39 U/L Final    Total Bilirubin 05/20/2023 0.3  0.0 - 1.2 mg/dL Final    ALT (SGPT) 05/20/2023 21  7 - 45 U/L Final     Patients treated with Sulfasalazine may generate    falsely decreased results for ALT.    WBC 05/20/2023 7.0  4.4 - 11.3 x10E9/L Final    nRBC 05/20/2023 0.0  0.0 - 0.0 /100 WBC Final    RBC 05/20/2023 4.52  4.00 - 5.20 x10E12/L Final    Hemoglobin 05/20/2023 14.3  12.0 - 16.0 g/dL Final    Hematocrit 05/20/2023 42.9  36.0 - 46.0 % Final    MCV 05/20/2023 95  80 - 100 fL Final    MCHC 05/20/2023 33.3  32.0 - 36.0 g/dL Final    Platelets 05/20/2023 266  150 - 450 x10E9/L Final    RDW 05/20/2023 12.2  11.5 - 14.5 % Final    Neutrophils % 05/20/2023 51.5  40.0 - 80.0 % Final    Immature Granulocytes %, Automated 05/20/2023 0.1  0.0 - 0.9 % Final     Immature Granulocyte Count (IG) includes promyelocytes,    myelocytes and metamyelocytes but does not include bands.   Percent differential counts (%) should be interpreted in the   context of the absolute cell counts (cells/L).    Lymphocytes % 05/20/2023 37.7  13.0 - 44.0 % Final    Monocytes % 05/20/2023 8.1  2.0 - 10.0 % Final    Eosinophils % 05/20/2023 1.7  0.0 - 6.0 % Final    Basophils % 05/20/2023 0.9  0.0 - 2.0 % Final    Neutrophils Absolute 05/20/2023 3.62  1.20 - 7.70 x10E9/L Final    Lymphocytes Absolute 05/20/2023 2.65  1.20 - 4.80 x10E9/L Final    Monocytes Absolute 05/20/2023 0.57  0.10 - 1.00 x10E9/L Final    Eosinophils Absolute 05/20/2023 0.12  0.00 - 0.70 x10E9/L Final    Basophils Absolute 05/20/2023 0.06  0.00 - 0.10 x10E9/L  Final   Office Visit on 05/17/2023   Component Date Value Ref Range Status    DRUG SCREEN COMMENT URINE 05/20/2023 SEE BELOW   Final    Drug screen results are presumptive and should not be used to assess   compliance with prescribed medication. Definitive confirmatory drug testing   has been added to this sample for any positive screen result and will be   reported separately.   .  Toxicology screening results are reported qualitatively. The concentration   must be greater than or equal to the cutoff to be reported as positive. The   concentration at which the screening test can detect an individual drug or   metabolite varies. The absence of expected drug(s) and/or drug metabolite(s)   may indicate non-compliance, inappropriate timing of specimen collection   relative to drug administration, poor drug absorption, diluted/adulterated   urine, or limitations of testing. For medical purposes only; not valid for   forensic use.   .  Interpretive questions should be directed to the laboratory medical   directors.      Amphetamine Screen, Urine 05/20/2023 PRESUMPTIVE NEGATIVE  NEGATIVE Final     CUTOFF LEVEL:  500 NG/ML   Cross-reactivity has been reported with high concentrations   of the following drugs: buproprion, chloroquine, chlorpromazine,   ephedrine, mephentermine, fenfluramine, phentermine,   phenylpropanolamine, pseudoephedrine, and propranolol.    Barbiturate Screen, Urine 05/20/2023 PRESUMPTIVE NEGATIVE  NEGATIVE Final     CUTOFF LEVEL: 200 NG/ML    BENZODIAZEPINE (PRESENCE) IN URINE* 05/20/2023 PRESUMPTIVE NEGATIVE  NEGATIVE Final     CUTOFF LEVEL: 200 NG/ML    Cannabinoid Screen, Urine 05/20/2023 PRESUMPTIVE NEGATIVE  NEGATIVE Final     CUTOFF LEVEL: 50 NG/ML    Cocaine Screen, Urine 05/20/2023 PRESUMPTIVE NEGATIVE  NEGATIVE Final     CUTOFF LEVEL: 150 NG/ML    Fentanyl, Ur 05/20/2023 PRESUMPTIVE NEGATIVE  NEGATIVE Final     CUTOFF LEVEL:  5 NG/ML    Methadone Screen, Urine 05/20/2023 PRESUMPTIVE NEGATIVE   NEGATIVE Final     CUTOFF LEVEL: 150 NG/ML   The metabolite L-alpha-acetylmethadol (LAAM) is not   detected by this method in concentrations that would   be found in the urine of patients on LAAM therapy.    Opiate Screen, Urine 05/20/2023 PRESUMPTIVE NEGATIVE  NEGATIVE Final     CUTOFF LEVEL: 300 NG/ML   The opiate screen does not detect fentanyl, meperidine, or    tramadol. Oxycodone is not consistently detected (refer to   Oxycodone Screen, Urine result).    Oxycodone Screen, Ur 05/20/2023 PRESUMPTIVE NEGATIVE  NEGATIVE Final     CUTOFF LEVEL: 100 NG/ML    This test will accurately detect both oxycodone and oxymorphone.         PCP Screen, Urine 05/20/2023 PRESUMPTIVE NEGATIVE  NEGATIVE Final     CUTOFF LEVEL:  25 NG/ML   Cross-reactivity has been reported with dextromethorphan.     Current Outpatient Medications on File Prior to Visit   Medication Sig Dispense Refill    zolpidem (Ambien) 10 mg tablet Take 1 tablet (10 mg) by mouth once daily at bedtime. Do not start before April 16, 2024. 30 tablet 0    metoprolol tartrate (Lopressor) 25 mg tablet Take 1 tablet (25 mg) by mouth 2 times a day. 60 tablet 5    [DISCONTINUED] zolpidem (Ambien) 10 mg tablet Take 1 tablet (10 mg) by mouth once daily at bedtime. 30 tablet 0    [DISCONTINUED] zolpidem (Ambien) 10 mg tablet Take 1 tablet (10 mg) by mouth once daily at bedtime. Do not start before March 17, 2024. 30 tablet 0     No current facility-administered medications on file prior to visit.     No images are attached to the encounter.      OARRS:  Lilly Grimaldo DO on 5/15/2024  7:25 AM  I have personally reviewed the OARRS report for Álvaro Dejesus. I have considered the risks of abuse, dependence, addiction and diversion    Is the patient prescribed a combination of a benzodiazepine and opioid?  No    Last Urine Drug Screen / ordered today: Yes  Recent Results (from the past 8760 hour(s))   Drug Screen, Urine With Reflex to Confirmation    Collection Time: 05/20/23   9:28 AM   Result Value Ref Range    DRUG SCREEN COMMENT URINE SEE BELOW     Amphetamine Screen, Urine PRESUMPTIVE NEGATIVE NEGATIVE    Barbiturate Screen, Urine PRESUMPTIVE NEGATIVE NEGATIVE    BENZODIAZEPINE (PRESENCE) IN URINE BY SCREEN METHOD PRESUMPTIVE NEGATIVE NEGATIVE    Cannabinoid Screen, Urine PRESUMPTIVE NEGATIVE NEGATIVE    Cocaine Screen, Urine PRESUMPTIVE NEGATIVE NEGATIVE    Fentanyl, Ur PRESUMPTIVE NEGATIVE NEGATIVE    Methadone Screen, Urine PRESUMPTIVE NEGATIVE NEGATIVE    Opiate Screen, Urine PRESUMPTIVE NEGATIVE NEGATIVE    Oxycodone Screen, Ur PRESUMPTIVE NEGATIVE NEGATIVE    PCP Screen, Urine PRESUMPTIVE NEGATIVE NEGATIVE     Results are as expected.     Controlled Substance Agreement:  Date of the Last Agreement: 5/15/2024  Reviewed Controlled Substance Agreement including but not limited to the benefits, risks, and alternatives to treatment with a Controlled Substance medication(s).    Sleep Aids:   What is the patient's goal of therapy? Better sleep  Is this being achieved with current treatment? yes    Activities of Daily Living:   Is your overall impression that this patient is benefiting (symptom reduction outweighs side effects) from sleep aid therapy? Yes     1. Physical Functioning: Same  2. Family Relationship: Same  3. Social Relationship: Same  4. Mood: Same  5. Sleep Patterns: Same  6. Overall Function: Same        Assessment/Plan   Diagnoses and all orders for this visit:  Insomnia, unspecified type  POTS (postural orthostatic tachycardia syndrome)  Attention disturbance    1.  Patient realizes he has to come in every 90 days to have this medication renewed  2.  Patient did sign Ambien contract 5/15/2024  3.  Patient's urine drug screen will be performed 5/15/2024  4.  Patient to call for questions or concerns

## 2024-08-14 ENCOUNTER — APPOINTMENT (OUTPATIENT)
Dept: PRIMARY CARE | Facility: CLINIC | Age: 34
End: 2024-08-14
Payer: COMMERCIAL

## 2024-08-14 VITALS
BODY MASS INDEX: 37.82 KG/M2 | WEIGHT: 229 LBS | TEMPERATURE: 98.1 F | SYSTOLIC BLOOD PRESSURE: 138 MMHG | OXYGEN SATURATION: 96 % | HEART RATE: 138 BPM | DIASTOLIC BLOOD PRESSURE: 99 MMHG

## 2024-08-14 DIAGNOSIS — G47.00 INSOMNIA, UNSPECIFIED TYPE: ICD-10-CM

## 2024-08-14 PROCEDURE — 99214 OFFICE O/P EST MOD 30 MIN: CPT | Performed by: FAMILY MEDICINE

## 2024-08-14 PROCEDURE — 1036F TOBACCO NON-USER: CPT | Performed by: FAMILY MEDICINE

## 2024-08-14 RX ORDER — ZOLPIDEM TARTRATE 10 MG/1
10 TABLET ORAL NIGHTLY
Qty: 30 TABLET | Refills: 0 | Status: SHIPPED | OUTPATIENT
Start: 2024-09-14 | End: 2024-10-14

## 2024-08-14 RX ORDER — ZOLPIDEM TARTRATE 10 MG/1
10 TABLET ORAL NIGHTLY
Qty: 30 TABLET | Refills: 0 | Status: SHIPPED | OUTPATIENT
Start: 2024-08-14 | End: 2024-09-13

## 2024-08-14 RX ORDER — ZOLPIDEM TARTRATE 10 MG/1
10 TABLET ORAL NIGHTLY
Qty: 30 TABLET | Refills: 0 | Status: SHIPPED | OUTPATIENT
Start: 2024-10-13 | End: 2024-11-12

## 2024-08-14 ASSESSMENT — ENCOUNTER SYMPTOMS
EYE DISCHARGE: 0
APPETITE CHANGE: 0
COLOR CHANGE: 0
SLEEP DISTURBANCE: 1
FEVER: 0
CONFUSION: 0
ARTHRALGIAS: 0
WHEEZING: 0
CONSTIPATION: 0
DIARRHEA: 0
FACIAL SWELLING: 0
CHILLS: 0
PALPITATIONS: 0
ACTIVITY CHANGE: 0
COUGH: 0

## 2024-08-14 NOTE — PROGRESS NOTES
Subjective   Patient ID: Abelino Dejesus is a 34 y.o. adult who presents for Med Refill.    Med Refill  Pertinent negatives include no arthralgias, chest pain, chills, congestion, coughing or fever.      Patient reports he has had insomnia for quite some time.  Reports that he has tried several medications in the past without success this medication was started with his primary care doctor in [place] or [place]. Dr. Downey [place]. Tyelnol PM, Lunesta, sonata, melatonin, Zquil without relief. He was concerned that all of these were giving him restless leg syndrome.     Review of Systems   Constitutional:  Negative for activity change, appetite change, chills and fever.   HENT:  Negative for congestion, ear pain and facial swelling.    Eyes:  Negative for discharge.   Respiratory:  Negative for cough and wheezing.    Cardiovascular:  Negative for chest pain, palpitations and leg swelling.   Gastrointestinal:  Negative for constipation and diarrhea.   Musculoskeletal:  Negative for arthralgias.   Skin:  Negative for color change and pallor.   Neurological:  Negative for syncope.   Psychiatric/Behavioral:  Positive for sleep disturbance. Negative for confusion.        Objective   BP (!) 144/113   Pulse (!) 138   Temp 36.7 °C (98.1 °F)   Wt 104 kg (229 lb)   SpO2 96%   BMI 37.82 kg/m²   BSA Body surface area is 2.19 meters squared.      Physical Exam  Constitutional:       General: He is not in acute distress.     Appearance: Normal appearance. He is not toxic-appearing.   HENT:      Head: Normocephalic.      Right Ear: Tympanic membrane, ear canal and external ear normal.      Left Ear: Tympanic membrane, ear canal and external ear normal.   Eyes:      Conjunctiva/sclera: Conjunctivae normal.      Pupils: Pupils are equal, round, and reactive to light.   Cardiovascular:      Rate and Rhythm: Normal rate and regular rhythm.      Pulses: Normal pulses.      Heart sounds: Normal heart sounds.    Pulmonary:      Effort: No respiratory distress.      Breath sounds: No wheezing, rhonchi or rales.   Musculoskeletal:         General: No swelling or tenderness.      Cervical back: No tenderness.   Skin:     Findings: No lesion or rash.   Neurological:      General: No focal deficit present.      Mental Status: He is alert and oriented to person, place, and time. Mental status is at baseline.      Gait: Gait normal.   Psychiatric:         Mood and Affect: Mood normal.         Behavior: Behavior normal.         Thought Content: Thought content normal.         Judgment: Judgment normal.       No visits with results within 1 Year(s) from this visit.   Latest known visit with results is:   Lab on 05/20/2023   Component Date Value Ref Range Status    Tobacco Screen, Urine 05/20/2023 NEGATIVE   Final    Cotinine, a metabolite of nicotine, is measured to screen   for nicotine exposure. The cut-off is set at 300ng/mL to   detect active exposure (smoking).    This test was developed and its performance characteristics   were determined by the [facility].    TSH 05/20/2023 3.29  0.44 - 3.98 mIU/L Final     TSH testing is performed using different testing    methodology at [facility] than at other    [facility]. Direct result comparisons should    only be made within the same method.    Cholesterol 05/20/2023 128  0 - 199 mg/dL Final    .      AGE      DESIRABLE   BORDERLINE HIGH   HIGH     0-19 Y     0 - 169       170 - 199     >/= 200    20-24 Y     0 - 189       190 - 224     >/= 225         >24 Y     0 - 199       200 - 239     >/= 240   **All ranges are based on fasting samples. Specific   therapeutic targets will vary based on patient-specific   cardiac risk.  .   Pediatric guidelines reference:Pediatrics 2011, 128(S5).   Adult guidelines reference: NCEP ATPIII Guidelines,     YASMIN 2001, 258:2486-97  .   Venipuncture immediately after or during the     administration of Metamizole may lead to falsely   low results. Testing should be performed immediately   prior to Metamizole dosing.    HDL 05/20/2023 47.8  mg/dL Final    .      AGE      VERY LOW   LOW     NORMAL    HIGH       0-19 Y       < 35   < 40     40-45     ----    20-24 Y       ----   < 40       >45     ----      >24 Y       ----   < 40     40-60      >60  .    Cholesterol/HDL Ratio 05/20/2023 2.7   Final    REF VALUES  DESIRABLE  < 3.4  HIGH RISK  > 5.0    LDL 05/20/2023 70  0 - 99 mg/dL Final    .                           NEAR      BORD      AGE      DESIRABLE  OPTIMAL    HIGH     HIGH     VERY HIGH     0-19 Y     0 - 109     ---    110-129   >/= 130     ----    20-24 Y     0 - 119     ---    120-159   >/= 160     ----      >24 Y     0 -  99   100-129  130-159   160-189     >/=190  .    VLDL 05/20/2023 10  0 - 40 mg/dL Final    Triglycerides 05/20/2023 50  0 - 149 mg/dL Final    .      AGE      DESIRABLE   BORDERLINE HIGH   HIGH     VERY HIGH   0 D-90 D    19 - 174         ----         ----        ----  91 D- 9 Y     0 -  74        75 -  99     >/= 100      ----    10-19 Y     0 -  89        90 - 129     >/= 130      ----    20-24 Y     0 - 114       115 - 149     >/= 150      ----         >24 Y     0 - 149       150 - 199    200- 499    >/= 500  .   Venipuncture immediately after or during the    administration of Metamizole may lead to falsely   low results. Testing should be performed immediately   prior to Metamizole dosing.    Glucose 05/20/2023 96  74 - 99 mg/dL Final    Sodium 05/20/2023 138  136 - 145 mmol/L Final    Potassium 05/20/2023 4.3  3.5 - 5.3 mmol/L Final    Chloride 05/20/2023 108 (H)  98 - 107 mmol/L Final    Bicarbonate 05/20/2023 24  21 - 32 mmol/L Final    Anion Gap 05/20/2023 10  10 - 20 mmol/L Final    Urea Nitrogen 05/20/2023 13  6 - 23 mg/dL Final    Creatinine 05/20/2023 0.88  0.50 - 1.05 mg/dL Final    GFR Female 05/20/2023 88  >90 mL/min/1.73m2 Corrected      CALCULATIONS OF ESTIMATED GFR ARE PERFORMED   USING THE 2021 CKD-EPI STUDY REFIT EQUATION   WITHOUT THE RACE VARIABLE FOR THE IDMS-TRACEABLE   CREATININE METHODS.    https://jasn.asnjournals.org/content/early/2021/09/22/ASN.[email or web address]  This is a corrected result. Previous value was DNR, verified at 06/14/2023   10:00    Calcium 05/20/2023 9.0  8.6 - 10.6 mg/dL Final    Albumin 05/20/2023 4.3  3.4 - 5.0 g/dL Final    Alkaline Phosphatase 05/20/2023 67  33 - 110 U/L Final    Total Protein 05/20/2023 7.1  6.4 - 8.2 g/dL Final    AST 05/20/2023 17  9 - 39 U/L Final    Total Bilirubin 05/20/2023 0.3  0.0 - 1.2 mg/dL Final    ALT (SGPT) 05/20/2023 21  7 - 45 U/L Final     Patients treated with Sulfasalazine may generate    falsely decreased results for ALT.    WBC 05/20/2023 7.0  4.4 - 11.3 x10E9/L Final    nRBC 05/20/2023 0.0  0.0 - 0.0 /100 WBC Final    RBC 05/20/2023 4.52  4.00 - 5.20 x10E12/L Final    Hemoglobin 05/20/2023 14.3  12.0 - 16.0 g/dL Final    Hematocrit 05/20/2023 42.9  36.0 - 46.0 % Final    MCV 05/20/2023 95  80 - 100 fL Final    MCHC 05/20/2023 33.3  32.0 - 36.0 g/dL Final    Platelets 05/20/2023 266  150 - 450 x10E9/L Final    RDW 05/20/2023 12.2  11.5 - 14.5 % Final    Neutrophils % 05/20/2023 51.5  40.0 - 80.0 % Final    Immature Granulocytes %, Automated 05/20/2023 0.1  0.0 - 0.9 % Final     Immature Granulocyte Count (IG) includes promyelocytes,    myelocytes and metamyelocytes but does not include bands.   Percent differential counts (%) should be interpreted in the   context of the absolute cell counts (cells/L).    Lymphocytes % 05/20/2023 37.7  13.0 - 44.0 % Final    Monocytes % 05/20/2023 8.1  2.0 - 10.0 % Final    Eosinophils % 05/20/2023 1.7  0.0 - 6.0 % Final    Basophils % 05/20/2023 0.9  0.0 - 2.0 % Final    Neutrophils Absolute 05/20/2023 3.62  1.20 - 7.70 x10E9/L Final    Lymphocytes Absolute 05/20/2023 2.65  1.20 - 4.80 x10E9/L Final    Monocytes Absolute 05/20/2023 0.57  0.10 - 1.00  x10E9/L Final    Eosinophils Absolute 05/20/2023 0.12  0.00 - 0.70 x10E9/L Final    Basophils Absolute 05/20/2023 0.06  0.00 - 0.10 x10E9/L Final     Current Outpatient Medications on File Prior to Visit   Medication Sig Dispense Refill    metoprolol tartrate (Lopressor) 25 mg tablet Take 1 tablet (25 mg) by mouth 2 times a day. 60 tablet 5    [DISCONTINUED] zolpidem (Ambien) 10 mg tablet Take 1 tablet (10 mg) by mouth once daily at bedtime. 30 tablet 0    [DISCONTINUED] zolpidem (Ambien) 10 mg tablet Take 1 tablet (10 mg) by mouth once daily at bedtime. Do not fill before June 14, 2024. 30 tablet 0    [DISCONTINUED] zolpidem (Ambien) 10 mg tablet Take 1 tablet (10 mg) by mouth once daily at bedtime. Do not fill before July 14, 2024. 30 tablet 0     No current facility-administered medications on file prior to visit.     No images are attached to the encounter.      OARRS:  Lilly Grimaldo DO on 8/14/2024  8:22 AM  I have personally reviewed the OARRS report for Álvaro Dejesus. I have considered the risks of abuse, dependence, addiction and diversion    Is the patient prescribed a combination of a benzodiazepine and opioid?  No    Last Urine Drug Screen / ordered today: Yes  No results found for this or any previous visit (from the past 8760 hour(s)).    Results are as expected.     Controlled Substance Agreement:  Date of the Last Agreement: 5/15/2024  Reviewed Controlled Substance Agreement including but not limited to the benefits, risks, and alternatives to treatment with a Controlled Substance medication(s).    Sleep Aids:   What is the patient's goal of therapy? Better sleep  Is this being achieved with current treatment? yes    Activities of Daily Living:   Is your overall impression that this patient is benefiting (symptom reduction outweighs side effects) from sleep aid therapy? Yes     1. Physical Functioning: Same  2. Family Relationship: Same  3. Social Relationship: Same  4. Mood: Same  5. Sleep Patterns:  Same  6. Overall Function: Same        Assessment/Plan   Diagnoses and all orders for this visit:  Insomnia, unspecified type  -     zolpidem (Ambien) 10 mg tablet; Take 1 tablet (10 mg) by mouth once daily at bedtime.  -     zolpidem (Ambien) 10 mg tablet; Take 1 tablet (10 mg) by mouth once daily at bedtime. Do not fill before September 14, 2024.  -     zolpidem (Ambien) 10 mg tablet; Take 1 tablet (10 mg) by mouth once daily at bedtime. Do not fill before October 13, 2024.      1.  Patient realizes he has to come in every 90 days to have this medication renewed  2.  Patient did sign Ambien contract 5/15/2024  3.  Patient's urine drug screen will be performed 5/15/2024  4.  Patient to call for questions or concerns

## 2024-11-11 ENCOUNTER — APPOINTMENT (OUTPATIENT)
Dept: PRIMARY CARE | Facility: CLINIC | Age: 34
End: 2024-11-11
Payer: COMMERCIAL

## 2024-11-11 VITALS
WEIGHT: 231.6 LBS | BODY MASS INDEX: 38.25 KG/M2 | DIASTOLIC BLOOD PRESSURE: 80 MMHG | SYSTOLIC BLOOD PRESSURE: 134 MMHG | HEART RATE: 130 BPM

## 2024-11-11 DIAGNOSIS — E34.9 TESTOSTERONE DEFICIENCY: ICD-10-CM

## 2024-11-11 DIAGNOSIS — G47.00 INSOMNIA, UNSPECIFIED TYPE: ICD-10-CM

## 2024-11-11 PROCEDURE — 1036F TOBACCO NON-USER: CPT | Performed by: FAMILY MEDICINE

## 2024-11-11 PROCEDURE — 99214 OFFICE O/P EST MOD 30 MIN: CPT | Performed by: FAMILY MEDICINE

## 2024-11-11 RX ORDER — ZOLPIDEM TARTRATE 10 MG/1
10 TABLET ORAL NIGHTLY
Qty: 30 TABLET | Refills: 0 | Status: SHIPPED | OUTPATIENT
Start: 2024-11-11 | End: 2024-12-11

## 2024-11-11 RX ORDER — ZOLPIDEM TARTRATE 10 MG/1
10 TABLET ORAL NIGHTLY
Qty: 30 TABLET | Refills: 0 | Status: SHIPPED | OUTPATIENT
Start: 2025-01-10 | End: 2025-02-09

## 2024-11-11 RX ORDER — ZOLPIDEM TARTRATE 10 MG/1
10 TABLET ORAL NIGHTLY
Qty: 30 TABLET | Refills: 0 | Status: SHIPPED | OUTPATIENT
Start: 2024-12-11 | End: 2025-01-10

## 2024-11-11 ASSESSMENT — ENCOUNTER SYMPTOMS
SLEEP DISTURBANCE: 1
APPETITE CHANGE: 0
DIARRHEA: 0
CHILLS: 0
COLOR CHANGE: 0
CONFUSION: 0
WHEEZING: 0
PALPITATIONS: 0
ARTHRALGIAS: 0
FACIAL SWELLING: 0
COUGH: 0
FEVER: 0
EYE DISCHARGE: 0
ACTIVITY CHANGE: 0
CONSTIPATION: 0

## 2024-11-11 NOTE — PROGRESS NOTES
Subjective   Patient ID: Abelino Dejesus is a 34 y.o. adult who presents for Med Refill.    Med Refill  Pertinent negatives include no arthralgias, chest pain, chills, congestion, coughing or fever.      Patient reports he has had insomnia for quite some time.  Reports that he has tried several medications in the past without success this medication was started with his primary care doctor in [place] or [place]. Dr. Downey [place]. Tyelnol PM, Lunesta, sonata, melatonin, Zquil without relief. He was concerned that all of these were giving him restless leg syndrome.     Review of Systems   Constitutional:  Negative for activity change, appetite change, chills and fever.   HENT:  Negative for congestion, ear pain and facial swelling.    Eyes:  Negative for discharge.   Respiratory:  Negative for cough and wheezing.    Cardiovascular:  Negative for chest pain, palpitations and leg swelling.   Gastrointestinal:  Negative for constipation and diarrhea.   Musculoskeletal:  Negative for arthralgias.   Skin:  Negative for color change and pallor.   Neurological:  Negative for syncope.   Psychiatric/Behavioral:  Positive for sleep disturbance. Negative for confusion.        Objective   /80 (BP Location: Right arm, Patient Position: Sitting)   Pulse (!) 130   Wt 105 kg (231 lb 9.6 oz)   BMI 38.25 kg/m²   BSA Body surface area is 2.2 meters squared.      Physical Exam  Constitutional:       General: He is not in acute distress.     Appearance: Normal appearance. He is not toxic-appearing.   HENT:      Head: Normocephalic.      Right Ear: Tympanic membrane, ear canal and external ear normal.      Left Ear: Tympanic membrane, ear canal and external ear normal.   Eyes:      Conjunctiva/sclera: Conjunctivae normal.      Pupils: Pupils are equal, round, and reactive to light.   Cardiovascular:      Rate and Rhythm: Normal rate and regular rhythm.      Pulses: Normal pulses.      Heart sounds: Normal heart  sounds.   Pulmonary:      Effort: No respiratory distress.      Breath sounds: No wheezing, rhonchi or rales.   Musculoskeletal:         General: No swelling or tenderness.      Cervical back: No tenderness.   Skin:     Findings: No lesion or rash.   Neurological:      General: No focal deficit present.      Mental Status: He is alert and oriented to person, place, and time. Mental status is at baseline.      Gait: Gait normal.   Psychiatric:         Mood and Affect: Mood normal.         Behavior: Behavior normal.         Thought Content: Thought content normal.         Judgment: Judgment normal.       No visits with results within 1 Year(s) from this visit.   Latest known visit with results is:   Lab on 05/20/2023   Component Date Value Ref Range Status    Tobacco Screen, Urine 05/20/2023 NEGATIVE   Final    Cotinine, a metabolite of nicotine, is measured to screen   for nicotine exposure. The cut-off is set at 300ng/mL to   detect active exposure (smoking).    This test was developed and its performance characteristics   were determined by the [facility].    TSH 05/20/2023 3.29  0.44 - 3.98 mIU/L Final     TSH testing is performed using different testing    methodology at [facility] than at other    [facility]. Direct result comparisons should    only be made within the same method.    Cholesterol 05/20/2023 128  0 - 199 mg/dL Final    .      AGE      DESIRABLE   BORDERLINE HIGH   HIGH     0-19 Y     0 - 169       170 - 199     >/= 200    20-24 Y     0 - 189       190 - 224     >/= 225         >24 Y     0 - 199       200 - 239     >/= 240   **All ranges are based on fasting samples. Specific   therapeutic targets will vary based on patient-specific   cardiac risk.  .   Pediatric guidelines reference:Pediatrics 2011, 128(S5).   Adult guidelines reference: NCEP ATPIII Guidelines,     YASMIN 2001, 258:2486-97  .   Venipuncture immediately after or  during the    administration of Metamizole may lead to falsely   low results. Testing should be performed immediately   prior to Metamizole dosing.    HDL 05/20/2023 47.8  mg/dL Final    .      AGE      VERY LOW   LOW     NORMAL    HIGH       0-19 Y       < 35   < 40     40-45     ----    20-24 Y       ----   < 40       >45     ----      >24 Y       ----   < 40     40-60      >60  .    Cholesterol/HDL Ratio 05/20/2023 2.7   Final    REF VALUES  DESIRABLE  < 3.4  HIGH RISK  > 5.0    LDL 05/20/2023 70  0 - 99 mg/dL Final    .                           NEAR      BORD      AGE      DESIRABLE  OPTIMAL    HIGH     HIGH     VERY HIGH     0-19 Y     0 - 109     ---    110-129   >/= 130     ----    20-24 Y     0 - 119     ---    120-159   >/= 160     ----      >24 Y     0 -  99   100-129  130-159   160-189     >/=190  .    VLDL 05/20/2023 10  0 - 40 mg/dL Final    Triglycerides 05/20/2023 50  0 - 149 mg/dL Final    .      AGE      DESIRABLE   BORDERLINE HIGH   HIGH     VERY HIGH   0 D-90 D    19 - 174         ----         ----        ----  91 D- 9 Y     0 -  74        75 -  99     >/= 100      ----    10-19 Y     0 -  89        90 - 129     >/= 130      ----    20-24 Y     0 - 114       115 - 149     >/= 150      ----         >24 Y     0 - 149       150 - 199    200- 499    >/= 500  .   Venipuncture immediately after or during the    administration of Metamizole may lead to falsely   low results. Testing should be performed immediately   prior to Metamizole dosing.    Glucose 05/20/2023 96  74 - 99 mg/dL Final    Sodium 05/20/2023 138  136 - 145 mmol/L Final    Potassium 05/20/2023 4.3  3.5 - 5.3 mmol/L Final    Chloride 05/20/2023 108 (H)  98 - 107 mmol/L Final    Bicarbonate 05/20/2023 24  21 - 32 mmol/L Final    Anion Gap 05/20/2023 10  10 - 20 mmol/L Final    Urea Nitrogen 05/20/2023 13  6 - 23 mg/dL Final    Creatinine 05/20/2023 0.88  0.50 - 1.05 mg/dL Final    GFR Female 05/20/2023 88  >90 mL/min/1.73m2 Corrected      CALCULATIONS OF ESTIMATED GFR ARE PERFORMED   USING THE 2021 CKD-EPI STUDY REFIT EQUATION   WITHOUT THE RACE VARIABLE FOR THE IDMS-TRACEABLE   CREATININE METHODS.    https://jasn.asnjournals.org/content/early/2021/09/22/ASN.[email or web address]  This is a corrected result. Previous value was DNR, verified at 06/14/2023   10:00    Calcium 05/20/2023 9.0  8.6 - 10.6 mg/dL Final    Albumin 05/20/2023 4.3  3.4 - 5.0 g/dL Final    Alkaline Phosphatase 05/20/2023 67  33 - 110 U/L Final    Total Protein 05/20/2023 7.1  6.4 - 8.2 g/dL Final    AST 05/20/2023 17  9 - 39 U/L Final    Total Bilirubin 05/20/2023 0.3  0.0 - 1.2 mg/dL Final    ALT (SGPT) 05/20/2023 21  7 - 45 U/L Final     Patients treated with Sulfasalazine may generate    falsely decreased results for ALT.    WBC 05/20/2023 7.0  4.4 - 11.3 x10E9/L Final    nRBC 05/20/2023 0.0  0.0 - 0.0 /100 WBC Final    RBC 05/20/2023 4.52  4.00 - 5.20 x10E12/L Final    Hemoglobin 05/20/2023 14.3  12.0 - 16.0 g/dL Final    Hematocrit 05/20/2023 42.9  36.0 - 46.0 % Final    MCV 05/20/2023 95  80 - 100 fL Final    MCHC 05/20/2023 33.3  32.0 - 36.0 g/dL Final    Platelets 05/20/2023 266  150 - 450 x10E9/L Final    RDW 05/20/2023 12.2  11.5 - 14.5 % Final    Neutrophils % 05/20/2023 51.5  40.0 - 80.0 % Final    Immature Granulocytes %, Automated 05/20/2023 0.1  0.0 - 0.9 % Final     Immature Granulocyte Count (IG) includes promyelocytes,    myelocytes and metamyelocytes but does not include bands.   Percent differential counts (%) should be interpreted in the   context of the absolute cell counts (cells/L).    Lymphocytes % 05/20/2023 37.7  13.0 - 44.0 % Final    Monocytes % 05/20/2023 8.1  2.0 - 10.0 % Final    Eosinophils % 05/20/2023 1.7  0.0 - 6.0 % Final    Basophils % 05/20/2023 0.9  0.0 - 2.0 % Final    Neutrophils Absolute 05/20/2023 3.62  1.20 - 7.70 x10E9/L Final    Lymphocytes Absolute 05/20/2023 2.65  1.20 - 4.80 x10E9/L Final    Monocytes Absolute 05/20/2023 0.57  0.10 - 1.00  x10E9/L Final    Eosinophils Absolute 05/20/2023 0.12  0.00 - 0.70 x10E9/L Final    Basophils Absolute 05/20/2023 0.06  0.00 - 0.10 x10E9/L Final     Current Outpatient Medications on File Prior to Visit   Medication Sig Dispense Refill    metoprolol tartrate (Lopressor) 25 mg tablet Take 1 tablet (25 mg) by mouth 2 times a day. 60 tablet 5    [DISCONTINUED] zolpidem (Ambien) 10 mg tablet Take 1 tablet (10 mg) by mouth once daily at bedtime. 30 tablet 0    [DISCONTINUED] zolpidem (Ambien) 10 mg tablet Take 1 tablet (10 mg) by mouth once daily at bedtime. Do not fill before September 14, 2024. 30 tablet 0    [DISCONTINUED] zolpidem (Ambien) 10 mg tablet Take 1 tablet (10 mg) by mouth once daily at bedtime. Do not fill before October 13, 2024. 30 tablet 0     No current facility-administered medications on file prior to visit.     No images are attached to the encounter.      OARRS:  No data recorded  I have personally reviewed the OARRS report for Álvaro Dejesus. I have considered the risks of abuse, dependence, addiction and diversion    Is the patient prescribed a combination of a benzodiazepine and opioid?  No    Last Urine Drug Screen / ordered today: Yes  No results found for this or any previous visit (from the past 8760 hours).    Results are as expected.     Controlled Substance Agreement:  Date of the Last Agreement: 5/15/2024  Reviewed Controlled Substance Agreement including but not limited to the benefits, risks, and alternatives to treatment with a Controlled Substance medication(s).    Sleep Aids:   What is the patient's goal of therapy? Better sleep  Is this being achieved with current treatment? yes    Activities of Daily Living:   Is your overall impression that this patient is benefiting (symptom reduction outweighs side effects) from sleep aid therapy? Yes     1. Physical Functioning: Same  2. Family Relationship: Same  3. Social Relationship: Same  4. Mood: Same  5. Sleep Patterns: Same  6. Overall  Function: Same        Assessment/Plan   Diagnoses and all orders for this visit:  Insomnia, unspecified type  -     zolpidem (Ambien) 10 mg tablet; Take 1 tablet (10 mg) by mouth once daily at bedtime.  -     zolpidem (Ambien) 10 mg tablet; Take 1 tablet (10 mg) by mouth once daily at bedtime. Do not fill before December 11, 2024.  -     zolpidem (Ambien) 10 mg tablet; Take 1 tablet (10 mg) by mouth once daily at bedtime. Do not fill before January 10, 2025.      1.  Patient realizes he has to come in every 90 days to have this medication renewed  2.  Patient did sign Ambien contract 5/15/2024  3.  Patient's urine drug screen will be performed 5/15/2024  4.  Patient to call for questions or concerns

## 2025-02-11 ENCOUNTER — APPOINTMENT (OUTPATIENT)
Dept: PRIMARY CARE | Facility: CLINIC | Age: 35
End: 2025-02-11
Payer: COMMERCIAL

## 2025-02-11 VITALS
SYSTOLIC BLOOD PRESSURE: 124 MMHG | WEIGHT: 235.8 LBS | HEART RATE: 92 BPM | DIASTOLIC BLOOD PRESSURE: 80 MMHG | BODY MASS INDEX: 38.94 KG/M2 | OXYGEN SATURATION: 97 %

## 2025-02-11 DIAGNOSIS — G47.00 INSOMNIA, UNSPECIFIED TYPE: ICD-10-CM

## 2025-02-11 PROCEDURE — 1036F TOBACCO NON-USER: CPT | Performed by: FAMILY MEDICINE

## 2025-02-11 PROCEDURE — 99214 OFFICE O/P EST MOD 30 MIN: CPT | Performed by: FAMILY MEDICINE

## 2025-02-11 RX ORDER — ZOLPIDEM TARTRATE 10 MG/1
10 TABLET ORAL NIGHTLY
Qty: 30 TABLET | Refills: 0 | Status: SHIPPED | OUTPATIENT
Start: 2025-02-11 | End: 2025-03-13

## 2025-02-11 RX ORDER — ZOLPIDEM TARTRATE 10 MG/1
10 TABLET ORAL NIGHTLY
Qty: 30 TABLET | Refills: 0 | Status: SHIPPED | OUTPATIENT
Start: 2025-03-13 | End: 2025-04-12

## 2025-02-11 RX ORDER — ZOLPIDEM TARTRATE 10 MG/1
10 TABLET ORAL NIGHTLY
Qty: 30 TABLET | Refills: 0 | Status: SHIPPED | OUTPATIENT
Start: 2025-04-12 | End: 2025-05-12

## 2025-02-11 ASSESSMENT — ENCOUNTER SYMPTOMS
CHILLS: 0
CONSTIPATION: 0
WHEEZING: 0
FACIAL SWELLING: 0
COLOR CHANGE: 0
APPETITE CHANGE: 0
EYE DISCHARGE: 0
ACTIVITY CHANGE: 0
PALPITATIONS: 0
ARTHRALGIAS: 0
SLEEP DISTURBANCE: 1
OCCASIONAL FEELINGS OF UNSTEADINESS: 1
DIARRHEA: 0
FEVER: 0
DEPRESSION: 0
COUGH: 0
LOSS OF SENSATION IN FEET: 0
CONFUSION: 0

## 2025-02-11 ASSESSMENT — PATIENT HEALTH QUESTIONNAIRE - PHQ9
SUM OF ALL RESPONSES TO PHQ9 QUESTIONS 1 AND 2: 0
1. LITTLE INTEREST OR PLEASURE IN DOING THINGS: NOT AT ALL
10. IF YOU CHECKED OFF ANY PROBLEMS, HOW DIFFICULT HAVE THESE PROBLEMS MADE IT FOR YOU TO DO YOUR WORK, TAKE CARE OF THINGS AT HOME, OR GET ALONG WITH OTHER PEOPLE: NOT DIFFICULT AT ALL
2. FEELING DOWN, DEPRESSED OR HOPELESS: NOT AT ALL

## 2025-02-11 NOTE — PROGRESS NOTES
Subjective   Patient ID: Abelino Dejesus is a 34 y.o. adult who presents for Med Refill.    Med Refill  Pertinent negatives include no arthralgias, chest pain, chills, congestion, coughing or fever.      Patient reports he has had insomnia for quite some time.  Reports that he has tried several medications in the past without success this medication was started with his primary care doctor in [place] or [place]. Dr. Downey [place] this [place]. Tyelnol PM, Lunesta, sonata, melatonin, Zquil without relief. He was concerned that all of these were giving him restless leg syndrome.     Review of Systems   Constitutional:  Negative for activity change, appetite change, chills and fever.   HENT:  Negative for congestion, ear pain and facial swelling.    Eyes:  Negative for discharge.   Respiratory:  Negative for cough and wheezing.    Cardiovascular:  Negative for chest pain, palpitations and leg swelling.   Gastrointestinal:  Negative for constipation and diarrhea.   Musculoskeletal:  Negative for arthralgias.   Skin:  Negative for color change and pallor.   Neurological:  Negative for syncope.   Psychiatric/Behavioral:  Positive for sleep disturbance. Negative for confusion.        Objective   /80 (BP Location: Left arm, Patient Position: Sitting)   Pulse 92   Wt 107 kg (235 lb 12.8 oz)   SpO2 97%   BMI 38.94 kg/m²   BSA Body surface area is 2.22 meters squared.      Physical Exam  Constitutional:       General: He is not in acute distress.     Appearance: Normal appearance. He is not toxic-appearing.   HENT:      Head: Normocephalic.      Right Ear: Tympanic membrane, ear canal and external ear normal.      Left Ear: Tympanic membrane, ear canal and external ear normal.   Eyes:      Conjunctiva/sclera: Conjunctivae normal.      Pupils: Pupils are equal, round, and reactive to light.   Cardiovascular:      Rate and Rhythm: Normal rate and regular rhythm.      Pulses: Normal pulses.      Heart sounds:  Normal heart sounds.   Pulmonary:      Effort: No respiratory distress.      Breath sounds: No wheezing, rhonchi or rales.   Musculoskeletal:         General: No swelling or tenderness.      Cervical back: No tenderness.   Skin:     Findings: No lesion or rash.   Neurological:      General: No focal deficit present.      Mental Status: He is alert and oriented to person, place, and time. Mental status is at baseline.      Gait: Gait normal.   Psychiatric:         Mood and Affect: Mood normal.         Behavior: Behavior normal.         Thought Content: Thought content normal.         Judgment: Judgment normal.     No visits with results within 1 Year(s) from this visit.   Latest known visit with results is:   Lab on 05/20/2023   Component Date Value Ref Range Status   • Tobacco Screen, Urine 05/20/2023 NEGATIVE   Final    Cotinine, a metabolite of nicotine, is measured to screen   for nicotine exposure. The cut-off is set at 300ng/mL to   detect active exposure (smoking).    This test was developed and its performance characteristics   were determined by the [facility].   • TSH 05/20/2023 3.29  0.44 - 3.98 mIU/L Final     TSH testing is performed using different testing    methodology at [facility] than at other    [facility]. Direct result comparisons should    only be made within the same method.   • Cholesterol 05/20/2023 128  0 - 199 mg/dL Final    .      AGE      DESIRABLE   BORDERLINE HIGH   HIGH     0-19 Y     0 - 169       170 - 199     >/= 200    20-24 Y     0 - 189       190 - 224     >/= 225         >24 Y     0 - 199       200 - 239     >/= 240   **All ranges are based on fasting samples. Specific   therapeutic targets will vary based on patient-specific   cardiac risk.  .   Pediatric guidelines reference:Pediatrics 2011, 128(S5).   Adult guidelines reference: NCEP ATPIII Guidelines,     YASMIN 2001, 258:2486-97  .   Venipuncture  immediately after or during the    administration of Metamizole may lead to falsely   low results. Testing should be performed immediately   prior to Metamizole dosing.   • HDL 05/20/2023 47.8  mg/dL Final    .      AGE      VERY LOW   LOW     NORMAL    HIGH       0-19 Y       < 35   < 40     40-45     ----    20-24 Y       ----   < 40       >45     ----      >24 Y       ----   < 40     40-60      >60  .   • Cholesterol/HDL Ratio 05/20/2023 2.7   Final    REF VALUES  DESIRABLE  < 3.4  HIGH RISK  > 5.0   • LDL 05/20/2023 70  0 - 99 mg/dL Final    .                           NEAR      BORD      AGE      DESIRABLE  OPTIMAL    HIGH     HIGH     VERY HIGH     0-19 Y     0 - 109     ---    110-129   >/= 130     ----    20-24 Y     0 - 119     ---    120-159   >/= 160     ----      >24 Y     0 -  99   100-129  130-159   160-189     >/=190  .   • VLDL 05/20/2023 10  0 - 40 mg/dL Final   • Triglycerides 05/20/2023 50  0 - 149 mg/dL Final    .      AGE      DESIRABLE   BORDERLINE HIGH   HIGH     VERY HIGH   0 D-90 D    19 - 174         ----         ----        ----  91 D- 9 Y     0 -  74        75 -  99     >/= 100      ----    10-19 Y     0 -  89        90 - 129     >/= 130      ----    20-24 Y     0 - 114       115 - 149     >/= 150      ----         >24 Y     0 - 149       150 - 199    200- 499    >/= 500  .   Venipuncture immediately after or during the    administration of Metamizole may lead to falsely   low results. Testing should be performed immediately   prior to Metamizole dosing.   • Glucose 05/20/2023 96  74 - 99 mg/dL Final   • Sodium 05/20/2023 138  136 - 145 mmol/L Final   • Potassium 05/20/2023 4.3  3.5 - 5.3 mmol/L Final   • Chloride 05/20/2023 108 (H)  98 - 107 mmol/L Final   • Bicarbonate 05/20/2023 24  21 - 32 mmol/L Final   • Anion Gap 05/20/2023 10  10 - 20 mmol/L Final   • Urea Nitrogen 05/20/2023 13  6 - 23 mg/dL Final   • Creatinine 05/20/2023 0.88  0.50 - 1.05 mg/dL Final   • GFR Female 05/20/2023  88  >90 mL/min/1.73m2 Corrected     CALCULATIONS OF ESTIMATED GFR ARE PERFORMED   USING THE 2021 CKD-EPI STUDY REFIT EQUATION   WITHOUT THE RACE VARIABLE FOR THE IDMS-TRACEABLE   CREATININE METHODS.    https://jasn.asnjournals.org/content/early/2021/09/22/ASN.[email or web address]  This is a corrected result. Previous value was DNR, verified at 06/14/2023   10:00   • Calcium 05/20/2023 9.0  8.6 - 10.6 mg/dL Final   • Albumin 05/20/2023 4.3  3.4 - 5.0 g/dL Final   • Alkaline Phosphatase 05/20/2023 67  33 - 110 U/L Final   • Total Protein 05/20/2023 7.1  6.4 - 8.2 g/dL Final   • AST 05/20/2023 17  9 - 39 U/L Final   • Total Bilirubin 05/20/2023 0.3  0.0 - 1.2 mg/dL Final   • ALT (SGPT) 05/20/2023 21  7 - 45 U/L Final     Patients treated with Sulfasalazine may generate    falsely decreased results for ALT.   • WBC 05/20/2023 7.0  4.4 - 11.3 x10E9/L Final   • nRBC 05/20/2023 0.0  0.0 - 0.0 /100 WBC Final   • RBC 05/20/2023 4.52  4.00 - 5.20 x10E12/L Final   • Hemoglobin 05/20/2023 14.3  12.0 - 16.0 g/dL Final   • Hematocrit 05/20/2023 42.9  36.0 - 46.0 % Final   • MCV 05/20/2023 95  80 - 100 fL Final   • MCHC 05/20/2023 33.3  32.0 - 36.0 g/dL Final   • Platelets 05/20/2023 266  150 - 450 x10E9/L Final   • RDW 05/20/2023 12.2  11.5 - 14.5 % Final   • Neutrophils % 05/20/2023 51.5  40.0 - 80.0 % Final   • Immature Granulocytes %, Automated 05/20/2023 0.1  0.0 - 0.9 % Final     Immature Granulocyte Count (IG) includes promyelocytes,    myelocytes and metamyelocytes but does not include bands.   Percent differential counts (%) should be interpreted in the   context of the absolute cell counts (cells/L).   • Lymphocytes % 05/20/2023 37.7  13.0 - 44.0 % Final   • Monocytes % 05/20/2023 8.1  2.0 - 10.0 % Final   • Eosinophils % 05/20/2023 1.7  0.0 - 6.0 % Final   • Basophils % 05/20/2023 0.9  0.0 - 2.0 % Final   • Neutrophils Absolute 05/20/2023 3.62  1.20 - 7.70 x10E9/L Final   • Lymphocytes Absolute 05/20/2023 2.65  1.20 - 4.80  x10E9/L Final   • Monocytes Absolute 05/20/2023 0.57  0.10 - 1.00 x10E9/L Final   • Eosinophils Absolute 05/20/2023 0.12  0.00 - 0.70 x10E9/L Final   • Basophils Absolute 05/20/2023 0.06  0.00 - 0.10 x10E9/L Final     Current Outpatient Medications on File Prior to Visit   Medication Sig Dispense Refill   • metoprolol tartrate (Lopressor) 25 mg tablet Take 1 tablet (25 mg) by mouth 2 times a day. 60 tablet 5   • [DISCONTINUED] zolpidem (Ambien) 10 mg tablet Take 1 tablet (10 mg) by mouth once daily at bedtime. 30 tablet 0   • [DISCONTINUED] zolpidem (Ambien) 10 mg tablet Take 1 tablet (10 mg) by mouth once daily at bedtime. Do not fill before December 11, 2024. 30 tablet 0   • [DISCONTINUED] zolpidem (Ambien) 10 mg tablet Take 1 tablet (10 mg) by mouth once daily at bedtime. Do not fill before January 10, 2025. 30 tablet 0     No current facility-administered medications on file prior to visit.     No images are attached to the encounter.      OARRS:  Lilly Grimaldo DO on 2/11/2025  7:55 AM  I have personally reviewed the OARRS report for Suzanne Dejesus. I have considered the risks of abuse, dependence, addiction and diversion    Is the patient prescribed a combination of a benzodiazepine and opioid?  No    Last Urine Drug Screen / ordered today: Yes  No results found for this or any previous visit (from the past 8760 hours).    Results are as expected.     Controlled Substance Agreement:  Date of the Last Agreement: 5/15/2024  Reviewed Controlled Substance Agreement including but not limited to the benefits, risks, and alternatives to treatment with a Controlled Substance medication(s).    Sleep Aids:   What is the patient's goal of therapy? Better sleep  Is this being achieved with current treatment? yes    Activities of Daily Living:   Is your overall impression that this patient is benefiting (symptom reduction outweighs side effects) from sleep aid therapy? Yes     1. Physical Functioning: Same  2. Family  Relationship: Same  3. Social Relationship: Same  4. Mood: Same  5. Sleep Patterns: Same  6. Overall Function: Same        Assessment/Plan   Diagnoses and all orders for this visit:  Insomnia, unspecified type  -     zolpidem (Ambien) 10 mg tablet; Take 1 tablet (10 mg) by mouth once daily at bedtime.  -     zolpidem (Ambien) 10 mg tablet; Take 1 tablet (10 mg) by mouth once daily at bedtime. Do not fill before March 13, 2025.  -     zolpidem (Ambien) 10 mg tablet; Take 1 tablet (10 mg) by mouth once daily at bedtime. Do not fill before April 12, 2025.      1.  Patient realizes he has to come in every 90 days to have this medication renewed  2.  Patient did sign Ambien contract 5/15/2024  3.  Patient's urine drug screen will be performed 5/15/2024  4.  Patient to call for questions or concerns